# Patient Record
Sex: MALE | Race: WHITE | NOT HISPANIC OR LATINO | Employment: FULL TIME | URBAN - METROPOLITAN AREA
[De-identification: names, ages, dates, MRNs, and addresses within clinical notes are randomized per-mention and may not be internally consistent; named-entity substitution may affect disease eponyms.]

---

## 2017-01-23 ENCOUNTER — OFFICE VISIT (OUTPATIENT)
Dept: PEDIATRICS | Facility: CLINIC | Age: 37
End: 2017-01-23
Payer: COMMERCIAL

## 2017-01-23 VITALS
TEMPERATURE: 99.4 F | WEIGHT: 234 LBS | DIASTOLIC BLOOD PRESSURE: 70 MMHG | HEART RATE: 78 BPM | HEIGHT: 70 IN | BODY MASS INDEX: 33.5 KG/M2 | OXYGEN SATURATION: 98 % | SYSTOLIC BLOOD PRESSURE: 110 MMHG

## 2017-01-23 DIAGNOSIS — J20.9 ACUTE BRONCHITIS, UNSPECIFIED ORGANISM: Primary | ICD-10-CM

## 2017-01-23 PROCEDURE — 99213 OFFICE O/P EST LOW 20 MIN: CPT | Performed by: INTERNAL MEDICINE

## 2017-01-23 RX ORDER — CODEINE PHOSPHATE AND GUAIFENESIN 10; 100 MG/5ML; MG/5ML
1 SOLUTION ORAL EVERY 6 HOURS PRN
Qty: 120 ML | Refills: 0 | Status: SHIPPED | OUTPATIENT
Start: 2017-01-23 | End: 2018-01-11

## 2017-01-23 NOTE — PROGRESS NOTES
SUBJECTIVE:                                                    Alexis Piña is a 36 year old male who presents to clinic today for the following health issues:    Acute Illness   Acute illness concerns: fever,cough  Onset: week     Fever: temps     Chills/Sweats: YES    Headache (location?): YES    Sinus Pressure:no    Conjunctivitis:  no    Ear Pain: no    Rhinorrhea: no    Congestion: no    Sore Throat: YES     Cough: YES, nonproductive    Wheeze: no    Decreased Appetite: YES    Nausea: no    Vomiting: no    Diarrhea:  no    Dysuria/Freq.: no    Fatigue/Achiness: YES    Sick/Strep Exposure: YES- wife      Therapies Tried and outcome:       Patient Active Problem List   Diagnosis     Seborrhea     HYPERLIPIDEMIA LDL GOAL <160     Lumbago     GERD (gastroesophageal reflux disease)     Prediabetes     Obesity     Hyperuricemia     LONNY (generalized anxiety disorder)     No past surgical history on file.    Social History   Substance Use Topics     Smoking status: Former Smoker     Smokeless tobacco: Never Used      Comment: quit 12/03/2005     Alcohol Use: 20.0 oz/week     Family History   Problem Relation Age of Onset     Neurologic Disorder Mother      MS     DIABETES Paternal Grandmother      C.A.D. No family hx of      Hypertension No family hx of      Cancer - colorectal No family hx of      Prostate Cancer No family hx of      CANCER Father      father diagnose with bladder cancer at age 61 yrs old         Current Outpatient Prescriptions   Medication Sig Dispense Refill             citalopram (CELEXA) 10 MG tablet Take 1 tablet (10 mg) by mouth daily 90 tablet 3     allopurinol (ZYLOPRIM) 300 MG tablet Take 1 tablet (300 mg) by mouth daily Due for labwork 90 tablet 1     omeprazole (PRILOSEC) 40 MG capsule Take 1 capsule (40 mg) by mouth daily as needed 90 capsule 2     mometasone (ELOCON) 0.1 % ointment Apply sparingly to affected area. 30 g 0     SUMAtriptan (IMITREX) 50 MG tablet Take 1 tablet (50 mg)  "by mouth at onset of headache for migraine May repeat dose in 2 hours.  Do not exceed 200 mg in 24 hours 18 tablet 1       OBJECTIVE:                                                    /70 mmHg  Pulse 78  Temp(Src) 99.4  F (37.4  C) (Oral)  Ht 5' 10\" (1.778 m)  Wt 234 lb (106.142 kg)  BMI 33.58 kg/m2  SpO2 98% Body mass index is 33.58 kg/(m^2).  GENERAL:  alert,  no distress  HENT: ear canals- normal; TMs- normal; oropharynx-normal  NECK: no tenderness, no adenopathy  RESP: lungs clear to auscultation - no rales, no rhonchi, no wheezes  CV: regular rates and rhythm, normal S1 S2     ASSESSMENT/PLAN:                                                        ICD-10-CM    1. Acute bronchitis, unspecified organism J20.9 guaiFENesin-codeine (ROBITUSSIN AC) 100-10 MG/5ML SOLN solution      Rest, fluids, otc symptomatic cares  Guaifenesin AC prn  Discussed rx if sx fail to improve over next 5-7 days    Tray Monroe MD  [facility]     "

## 2017-01-23 NOTE — PATIENT INSTRUCTIONS
Symptom management for cough and upper respiratory symptoms:    Sore throat: Warm salt water gargle as needed    Cough: Guaifenesin- an expectorant that helps to thin mucus and allow it to be cleared more easily.  Dextromethorphan helps to suppress cough.    Nasal and sinus congestion and drainage: Decongestants such as pseudoephedrine or phenylephrine help to reduce secretions and relieve stuffiness and pressure-related discomfort. Nasal saline spray can also be used to relieve sinus congestion as well.  Oxymetazoline (Afrin) spray can be used up to 3 days to manage nasal stuffiness.    Muscle aches and headache: Both acetaminophen and ibuprofen are effective-ibuprofen is slightly more effective for muscle aches and headache.  Ibuprofen should always be taken with food and plenty of fluids.      Off work today and tomorrow  If cough fails to improve in the next 3-5 days, start azithromycin

## 2017-01-23 NOTE — MR AVS SNAPSHOT
After Visit Summary   1/23/2017    Alexis Piña    MRN: [id number]           Patient Information     Date Of Birth          1980        Visit Information        Provider Department      1/23/2017 11:20 AM Tray Monroe MD [facility]        Today's Diagnoses     Acute bronchitis, unspecified organism    -  1       Care Instructions    Symptom management for cough and upper respiratory symptoms:    Sore throat: Warm salt water gargle as needed    Cough: Guaifenesin- an expectorant that helps to thin mucus and allow it to be cleared more easily.  Dextromethorphan helps to suppress cough.    Nasal and sinus congestion and drainage: Decongestants such as pseudoephedrine or phenylephrine help to reduce secretions and relieve stuffiness and pressure-related discomfort. Nasal saline spray can also be used to relieve sinus congestion as well.  Oxymetazoline (Afrin) spray can be used up to 3 days to manage nasal stuffiness.    Muscle aches and headache: Both acetaminophen and ibuprofen are effective-ibuprofen is slightly more effective for muscle aches and headache.  Ibuprofen should always be taken with food and plenty of fluids.      Off work today and tomorrow  If cough fails to improve in the next 3-5 days, start azithromycin          Follow-ups after your visit        Who to contact     If you have questions or need follow up information about today's clinic visit or your schedule please contact [facility] directly at [phone].  Normal or non-critical lab and imaging results will be communicated to you by MyChart, letter or phone within 4 business days after the clinic has received the results. If you do not hear from us within 7 days, please contact the clinic through MyChart or phone. If you have a critical or abnormal lab result, we will notify you by phone as soon as possible.  Submit refill requests through [employer] or call your pharmacy and they will forward the  "refill request to us. Please allow 3 business days for your refill to be completed.          Additional Information About Your Visit        [employer] Information     [employer] gives you secure access to your electronic health record. If you see a primary care provider, you can also send messages to your care team and make appointments. If you have questions, please call your primary care clinic.  If you do not have a primary care provider, please call [phone] and they will assist you.        Care EveryWhere ID     This is your Care EveryWhere ID. This could be used by other organizations to access your [facility] medical records  [id number]        Your Vitals Were     Pulse Temperature Height BMI (Body Mass Index) Pulse Oximetry       78 99.4  F (37.4  C) (Oral) 5' 10\" (1.778 m) 33.58 kg/m2 98%        Blood Pressure from Last 3 Encounters:   01/23/17 110/70   10/05/16 122/84   04/05/16 118/72    Weight from Last 3 Encounters:   01/23/17 234 lb (106.142 kg)   10/05/16 226 lb 4.8 oz (102.649 kg)   04/05/16 227 lb 3.2 oz (103.057 kg)              Today, you had the following     No orders found for display         Today's Medication Changes          These changes are accurate as of: 1/23/17 12:07 PM.  If you have any questions, ask your nurse or doctor.               Start taking these medicines.        Dose/Directions    guaiFENesin-codeine 100-10 MG/5ML Soln solution   Commonly known as:  ROBITUSSIN AC   Used for:  Acute bronchitis, unspecified organism   Started by:  Tray Monroe MD        Dose:  1 tsp.   Take 5 mLs by mouth every 6 hours as needed for cough   Quantity:  120 mL   Refills:  0         Stop taking these medicines if you haven't already. Please contact your care team if you have questions.     azithromycin 250 MG tablet   Commonly known as:  ZITHROMAX   Stopped by:  Tray Monroe MD           benzonatate 100 MG capsule   Commonly known as:  TESSALON   Stopped by:  Tray Monroe MD "           propranolol 80 MG 24 hr capsule   Commonly known as:  INDERAL LA   Stopped by:  Tray Monroe MD                Where to get your medicines      Some of these will need a paper prescription and others can be bought over the counter.  Ask your nurse if you have questions.     Bring a paper prescription for each of these medications    - guaiFENesin-codeine 100-10 MG/5ML Soln solution             Primary Care Provider Office Phone # Fax #    Tray Monroe [phone]       [facility] [address] [place] [address]        Thank you!     Thank you for choosing [facility]  for your care. Our goal is always to provide you with excellent care. Hearing back from our patients is one way we can continue to improve our services. Please take a few minutes to complete the written survey that you may receive in the mail after your visit with us. Thank you!             Your Updated Medication List - Protect others around you: Learn how to safely use, store and throw away your medicines at www.disposemymeds.org.          This list is accurate as of: 1/23/17 12:07 PM.  Always use your most recent med list.                   Brand Name Dispense Instructions for use    allopurinol 300 MG tablet    ZYLOPRIM    90 tablet    Take 1 tablet (300 mg) by mouth daily Due for labwork       citalopram 10 MG tablet    celeXA    90 tablet    Take 1 tablet (10 mg) by mouth daily       guaiFENesin-codeine 100-10 MG/5ML Soln solution    ROBITUSSIN AC    120 mL    Take 5 mLs by mouth every 6 hours as needed for cough       mometasone 0.1 % ointment    ELOCON    30 g    Apply sparingly to affected area.       omeprazole 40 MG capsule    priLOSEC    90 capsule    Take 1 capsule (40 mg) by mouth daily as needed       SUMAtriptan 50 MG tablet    IMITREX    18 tablet    Take 1 tablet (50 mg) by mouth at onset of headache for migraine May repeat dose in 2 hours.  Do not exceed 200 mg in 24  hours

## 2017-01-24 ENCOUNTER — TELEPHONE (OUTPATIENT)
Dept: PEDIATRICS | Facility: CLINIC | Age: 37
End: 2017-01-24

## 2017-01-24 DIAGNOSIS — J20.9 ACUTE BRONCHITIS: Primary | ICD-10-CM

## 2017-01-24 RX ORDER — AZITHROMYCIN 250 MG/1
TABLET, FILM COATED ORAL
Qty: 6 TABLET | Refills: 0 | Status: SHIPPED | OUTPATIENT
Start: 2017-01-24 | End: 2018-01-11

## 2017-01-24 NOTE — TELEPHONE ENCOUNTER
Pt says he has had worst ST of his life last night.  Has been ill for over 1wk.  Saw DR. BECK yesterday:  Off work today and tomorrow  If cough fails to improve in the next 3-5 days, start azithromycin  DR. BECK is OK with the Z pack today.  Sent to pharmacy now and pt informed.  Fela Mora RN

## 2017-01-26 NOTE — TELEPHONE ENCOUNTER
Call from patient-started z-pack.  States that he continues to cough-developed pleurecy he is not SOB, or wheezing.  Patient getting better- states that he has had low grade, no fever now. Has been using robitussin with codeine at nighttime, which helps a lot.  Patient is improving.    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    Advised to continue the abx as prescribe and finish the course.  Asked patient to call back with new symptoms or if he is not improving.  He verbalized understanding.    Marielena Handley, RN  Message handled by Nurse Triage.

## 2017-02-16 ENCOUNTER — MYC MEDICAL ADVICE (OUTPATIENT)
Dept: PEDIATRICS | Facility: CLINIC | Age: 37
End: 2017-02-16

## 2017-02-16 DIAGNOSIS — R05.9 COUGH: Primary | ICD-10-CM

## 2017-02-16 RX ORDER — ALBUTEROL SULFATE 90 UG/1
2 AEROSOL, METERED RESPIRATORY (INHALATION) EVERY 6 HOURS PRN
Qty: 1 INHALER | Refills: 0 | Status: SHIPPED | OUTPATIENT
Start: 2017-02-16 | End: 2018-01-11

## 2017-03-15 DIAGNOSIS — E79.0 HYPERURICEMIA: ICD-10-CM

## 2017-03-15 NOTE — TELEPHONE ENCOUNTER
ALLOPURINOL 300MG       Last Written Prescription Date: 9/19/2016  Last Fill Quantity: 90, # refills: 1  Last Office Visit with [facility], [facility] or [facility] prescribing provider:  1/23/2017        Uric Acid   Date Value Ref Range Status   03/03/2015 8.0 (H) 3.5 - 7.2 mg/dL Final     Comment:     Effective 7/30/2014, the reference range for this assay has changed to reflect   new instrumentation/methodology.     ]  Creatinine   Date Value Ref Range Status   12/14/2011 0.94 0.66 - 1.25 mg/dL Final   ]  Lab Results   Component Value Date    WBC 6.6 12/19/2011     Lab Results   Component Value Date    RBC 5.13 12/19/2011     Lab Results   Component Value Date    HGB 16.1 12/19/2011     Lab Results   Component Value Date    HCT 45.1 12/19/2011     No components found for: MCT  Lab Results   Component Value Date    MCV 88 12/19/2011     Lab Results   Component Value Date    MCH 31.4 12/19/2011     Lab Results   Component Value Date    MCHC 35.7 12/19/2011     Lab Results   Component Value Date    RDW 12.6 12/19/2011     Lab Results   Component Value Date     12/19/2011     Lab Results   Component Value Date    AST 30 12/14/2011     Lab Results   Component Value Date    ALT 48 12/14/2011

## 2017-03-20 RX ORDER — ALLOPURINOL 300 MG/1
300 TABLET ORAL DAILY
Qty: 90 TABLET | Refills: 1 | Status: SHIPPED | OUTPATIENT
Start: 2017-03-20 | End: 2019-08-12

## 2017-03-20 NOTE — TELEPHONE ENCOUNTER
Routing refill request to provider for review/approval because:  Labs not current:  Uric acid, CBC, CMP. (labs are ordered from 9/16)  Please sign if ok.   Chel Duque, RN  Triage Nurse

## 2017-03-31 DIAGNOSIS — K21.9 GERD (GASTROESOPHAGEAL REFLUX DISEASE): Primary | ICD-10-CM

## 2017-03-31 RX ORDER — OMEPRAZOLE 40 MG/1
40 CAPSULE, DELAYED RELEASE ORAL DAILY PRN
Qty: 90 CAPSULE | Refills: 1 | Status: SHIPPED | OUTPATIENT
Start: 2017-03-31 | End: 2017-10-13

## 2017-03-31 NOTE — TELEPHONE ENCOUNTER
omeprazole (PRILOSEC) 40 MG capsule      Last Written Prescription Date: 05-  Last Fill Quantity: 90,  # refills: 2   Last Office Visit with [facility], [facility] or [facility] prescribing provider: 01-

## 2017-03-31 NOTE — TELEPHONE ENCOUNTER
Prescription approved per [facility] [place].  Chel Duque, RN  Triage Nurse

## 2017-10-09 ENCOUNTER — TELEPHONE (OUTPATIENT)
Dept: PEDIATRICS | Facility: CLINIC | Age: 37
End: 2017-10-09

## 2017-10-09 DIAGNOSIS — R00.1 BRADYCARDIA: Primary | ICD-10-CM

## 2017-10-09 NOTE — TELEPHONE ENCOUNTER
Requesting referral to see a cardiologist    Patient recently finished a 50 mile bike race yesterday.     Felt his HR slow down but could feel bound sensation; so he slowed down his pace at the time. Did not manually check his pulse. Denied feeling light headedness, dizziness, or experiencing any vision changes at that time. Denied chest pain or numbness/tingling to extremities.This is the second time he has experienced this as he had the same episode occur at his last cycling event about 2 weeks ago    He reports he was wearing a 'heart monitor' device but does not believe it actually accurate. His HR dropped from 155 bpm to 60's in a manner of seconds .      PCP please advise of request. He reports no other sx at this time.      Juanis LÓPEZ RN, BSN, PHN  [place] [facility] RN

## 2017-10-13 ENCOUNTER — MYC REFILL (OUTPATIENT)
Dept: PEDIATRICS | Facility: CLINIC | Age: 37
End: 2017-10-13

## 2017-10-13 DIAGNOSIS — K21.9 GERD (GASTROESOPHAGEAL REFLUX DISEASE): ICD-10-CM

## 2017-10-14 NOTE — TELEPHONE ENCOUNTER
omeprazole (PRILOSEC) 40 MG capsule      Last Written Prescription Date: 03/31/2017  Last Fill Quantity: 90,  # refills: 1   Last Office Visit with [facility], [facility] or [facility] prescribing provider: 01/23/2017

## 2017-10-16 RX ORDER — OMEPRAZOLE 40 MG/1
40 CAPSULE, DELAYED RELEASE ORAL DAILY PRN
Qty: 90 CAPSULE | Refills: 0 | Status: SHIPPED | OUTPATIENT
Start: 2017-10-16 | End: 2018-01-11

## 2017-12-04 ENCOUNTER — MYC REFILL (OUTPATIENT)
Dept: PEDIATRICS | Facility: CLINIC | Age: 37
End: 2017-12-04

## 2017-12-04 DIAGNOSIS — R51.9 INTRACTABLE HEADACHE, UNSPECIFIED CHRONICITY PATTERN, UNSPECIFIED HEADACHE TYPE: ICD-10-CM

## 2017-12-04 NOTE — TELEPHONE ENCOUNTER
Message from [employer]:  Original authorizing provider: CAMILA Dugan CNP    Alexis Piña would like a refill of the following medications:  SUMAtriptan (IMITREX) 50 MG tablet [CAMILA Dugan CNP]    Preferred pharmacy: [id number] - [place], [place] - [address] AT [facility] [address]    Comment:  Migraines have returned. Please refill.

## 2017-12-04 NOTE — TELEPHONE ENCOUNTER
Imitrex 50 mg      Last Written Prescription Date: 04/05/16  Last Fill Quantity: 18, # refills: 1  Last Office Visit with [facility], [facility] or [facility] prescribing provider: 01/23/17       BP Readings from Last 3 Encounters:   01/23/17 110/70   10/05/16 122/84   04/05/16 118/72     Routing refill request to provider for review/approval because:  A break in medication/office appointment to discuss headaches?  TEDDY Briggs RN

## 2017-12-05 RX ORDER — SUMATRIPTAN 50 MG/1
50 TABLET, FILM COATED ORAL
Qty: 18 TABLET | Refills: 3 | Status: SHIPPED | OUTPATIENT
Start: 2017-12-05 | End: 2019-03-19

## 2018-01-04 ENCOUNTER — MYC REFILL (OUTPATIENT)
Dept: PEDIATRICS | Facility: CLINIC | Age: 38
End: 2018-01-04

## 2018-01-04 DIAGNOSIS — F41.1 GAD (GENERALIZED ANXIETY DISORDER): ICD-10-CM

## 2018-01-05 RX ORDER — CITALOPRAM HYDROBROMIDE 10 MG/1
10 TABLET ORAL DAILY
Qty: 30 TABLET | Refills: 0 | Status: SHIPPED | OUTPATIENT
Start: 2018-01-05 | End: 2018-01-11

## 2018-01-05 NOTE — TELEPHONE ENCOUNTER
Medication is being filled for 1 time refill only due to:  Patient needs to be seen because due for an annual physical and medication recheck.     Prescription approved per [facility] Refill Protocol.    Juanis LÓPEZ RN, BSN, PHN  [facility]

## 2018-01-05 NOTE — TELEPHONE ENCOUNTER
Please assist patient with scheduling an annual physical    Juanis LÓPEZ RN, BSN, PHN  [place] [facility] RN

## 2018-01-05 NOTE — TELEPHONE ENCOUNTER
Message from MyChart:  Original authorizing provider: Tray Monroe MD, MD    Alexis Piña would like a refill of the following medications:  citalopram (CELEXA) 10 MG tablet [Tray Monroe MD, MD]    Preferred pharmacy: [id number] - [place], [place] - [address] AT [facility] [address]    Comment:

## 2018-01-11 ENCOUNTER — OFFICE VISIT (OUTPATIENT)
Dept: PEDIATRICS | Facility: CLINIC | Age: 38
End: 2018-01-11
Payer: COMMERCIAL

## 2018-01-11 VITALS
BODY MASS INDEX: 33.5 KG/M2 | HEIGHT: 70 IN | WEIGHT: 234 LBS | DIASTOLIC BLOOD PRESSURE: 70 MMHG | SYSTOLIC BLOOD PRESSURE: 108 MMHG | HEART RATE: 60 BPM

## 2018-01-11 DIAGNOSIS — G47.30 SLEEP APNEA, UNSPECIFIED TYPE: ICD-10-CM

## 2018-01-11 DIAGNOSIS — E78.5 HYPERLIPIDEMIA LDL GOAL <160: ICD-10-CM

## 2018-01-11 DIAGNOSIS — Z00.00 ENCOUNTER FOR ROUTINE ADULT HEALTH EXAMINATION WITHOUT ABNORMAL FINDINGS: Primary | ICD-10-CM

## 2018-01-11 DIAGNOSIS — K21.9 GASTROESOPHAGEAL REFLUX DISEASE WITHOUT ESOPHAGITIS: ICD-10-CM

## 2018-01-11 DIAGNOSIS — R73.03 PREDIABETES: ICD-10-CM

## 2018-01-11 DIAGNOSIS — F41.1 GAD (GENERALIZED ANXIETY DISORDER): ICD-10-CM

## 2018-01-11 DIAGNOSIS — E79.0 HYPERURICEMIA: ICD-10-CM

## 2018-01-11 LAB — HBA1C MFR BLD: 5.2 % (ref 4.3–6)

## 2018-01-11 PROCEDURE — 36415 COLL VENOUS BLD VENIPUNCTURE: CPT | Performed by: INTERNAL MEDICINE

## 2018-01-11 PROCEDURE — 99213 OFFICE O/P EST LOW 20 MIN: CPT | Mod: 25 | Performed by: INTERNAL MEDICINE

## 2018-01-11 PROCEDURE — 84550 ASSAY OF BLOOD/URIC ACID: CPT | Performed by: INTERNAL MEDICINE

## 2018-01-11 PROCEDURE — 83721 ASSAY OF BLOOD LIPOPROTEIN: CPT | Mod: 59 | Performed by: INTERNAL MEDICINE

## 2018-01-11 PROCEDURE — 80053 COMPREHEN METABOLIC PANEL: CPT | Performed by: INTERNAL MEDICINE

## 2018-01-11 PROCEDURE — 99395 PREV VISIT EST AGE 18-39: CPT | Performed by: INTERNAL MEDICINE

## 2018-01-11 PROCEDURE — 83036 HEMOGLOBIN GLYCOSYLATED A1C: CPT | Performed by: INTERNAL MEDICINE

## 2018-01-11 PROCEDURE — 80061 LIPID PANEL: CPT | Performed by: INTERNAL MEDICINE

## 2018-01-11 RX ORDER — CITALOPRAM HYDROBROMIDE 10 MG/1
10 TABLET ORAL DAILY
Qty: 90 TABLET | Refills: 3 | Status: SHIPPED | OUTPATIENT
Start: 2018-01-11 | End: 2019-03-19

## 2018-01-11 RX ORDER — OMEPRAZOLE 40 MG/1
40 CAPSULE, DELAYED RELEASE ORAL DAILY PRN
Qty: 90 CAPSULE | Refills: 0 | Status: CANCELLED | OUTPATIENT
Start: 2018-01-11

## 2018-01-11 NOTE — NURSING NOTE
"Chief Complaint   Patient presents with     Physical       Initial /70 (Cuff Size: Adult Large)  Pulse 60  Ht 5' 10\" (1.778 m)  Wt 234 lb (106.1 kg)  BMI 33.58 kg/m2 Estimated body mass index is 33.58 kg/(m^2) as calculated from the following:    Height as of this encounter: 5' 10\" (1.778 m).    Weight as of this encounter: 234 lb (106.1 kg).  Medication Reconciliation: missy Orellana CMA    "

## 2018-01-11 NOTE — PATIENT INSTRUCTIONS
INSTRUCTIONS FOR TODAY:     reduce omeprazole to 20mg and take as needed.  If symptoms recur can try alternating days with ranitidine 150mg twice per day   clarify regarding father's history, we can set up colonoscopy if needed   schedule sleep study    Dr Monroe    Preventive Health Recommendations  Male Ages 26 - 39    Yearly exam:             See your health care provider every year in order to  o   Review health changes.   o   Discuss preventive care.    o   Review your medicines if your doctor has prescribed any.    You should be tested each year for STDs (sexually transmitted diseases), if you re at risk.     After age 35, talk to your provider about cholesterol testing. If you are at risk for heart disease, have your cholesterol tested at least every 5 years.     If you are at risk for diabetes, you should have a diabetes test (fasting glucose).  Shots: Get a flu shot each year. Get a tetanus shot every 10 years.     Nutrition:    Eat at least 5 servings of fruits and vegetables daily.     Eat whole-grain bread, whole-wheat pasta and brown rice instead of white grains and rice.     Talk to your provider about Calcium and Vitamin D.     Lifestyle    Exercise for at least 150 minutes a week (30 minutes a day, 5 days a week). This will help you control your weight and prevent disease.     Limit alcohol to one drink per day.     No smoking.     Wear sunscreen to prevent skin cancer.     See your dentist every six months for an exam and cleaning.

## 2018-01-11 NOTE — MR AVS SNAPSHOT
After Visit Summary   1/11/2018    Alexis Piña    MRN: [id number]           Patient Information     Date Of Birth          1980        Visit Information        Provider Department      1/11/2018 9:40 AM Tray Monroe MD [facility]        Today's Diagnoses     Encounter for routine adult health examination without abnormal findings    -  1    LONNY (generalized anxiety disorder)        Prediabetes        Hyperlipidemia LDL goal <160        Gastroesophageal reflux disease without esophagitis        Hyperuricemia        Sleep apnea, unspecified type          Care Instructions    INSTRUCTIONS FOR TODAY:     reduce omeprazole to 20mg and take as needed.  If symptoms recur can try alternating days with ranitidine 150mg twice per day   clarify regarding father's history, we can set up colonoscopy if needed   schedule sleep study    Dr Monroe    Preventive Health Recommendations  Male Ages 26 - 39    Yearly exam:             See your health care provider every year in order to  o   Review health changes.   o   Discuss preventive care.    o   Review your medicines if your doctor has prescribed any.    You should be tested each year for STDs (sexually transmitted diseases), if you re at risk.     After age 35, talk to your provider about cholesterol testing. If you are at risk for heart disease, have your cholesterol tested at least every 5 years.     If you are at risk for diabetes, you should have a diabetes test (fasting glucose).  Shots: Get a flu shot each year. Get a tetanus shot every 10 years.     Nutrition:    Eat at least 5 servings of fruits and vegetables daily.     Eat whole-grain bread, whole-wheat pasta and brown rice instead of white grains and rice.     Talk to your provider about Calcium and Vitamin D.     Lifestyle    Exercise for at least 150 minutes a week (30 minutes a day, 5 days a week). This will help you control your weight and prevent disease.     Limit alcohol to one  drink per day.     No smoking.     Wear sunscreen to prevent skin cancer.     See your dentist every six months for an exam and cleaning.             Follow-ups after your visit        Additional Services     SLEEP EVALUATION & MANAGEMENT REFERRAL - [facility] [phone]  (Age 18 and up)       Please be aware that coverage of these services is subject to the terms and limitations of your health insurance plan.  Call member services at your health plan with any benefit or coverage questions.      Please bring the following to your appointment:    >>   List of current medications   >>   This referral request   >>   Any documents/labs given to you for this referral                      Future tests that were ordered for you today     Open Future Orders        Priority Expected Expires Ordered    SLEEP EVALUATION & MANAGEMENT REFERRAL - [facility] [phone]  (Age 18 and up) Routine  1/11/2019 1/11/2018            Who to contact     If you have questions or need follow up information about today's clinic visit or your schedule please contact [facility] directly at [phone].  Normal or non-critical lab and imaging results will be communicated to you by MyChart, letter or phone within 4 business days after the clinic has received the results. If you do not hear from us within 7 days, please contact the clinic through MyChart or phone. If you have a critical or abnormal lab result, we will notify you by phone as soon as possible.  Submit refill requests through [employer] or call your pharmacy and they will forward the refill request to us. Please allow 3 business days for your refill to be completed.          Additional Information About Your Visit        [employer] Information     [employer] gives you secure access to your electronic health record. If you see a primary care provider, you can also send messages to your care team and make  "appointments. If you have questions, please call your primary care clinic.  If you do not have a primary care provider, please call [phone] and they will assist you.        Care EveryWhere ID     This is your Care EveryWhere ID. This could be used by other organizations to access your [facility] medical records  [id number]        Your Vitals Were     Pulse Height BMI (Body Mass Index)             60 5' 10\" (1.778 m) 33.58 kg/m2          Blood Pressure from Last 3 Encounters:   01/11/18 108/70   01/23/17 110/70   10/05/16 122/84    Weight from Last 3 Encounters:   01/11/18 234 lb (106.1 kg)   01/23/17 234 lb (106.1 kg)   10/05/16 226 lb 4.8 oz (102.6 kg)              We Performed the Following     Comprehensive metabolic panel     Hemoglobin A1c     Lipid panel reflex to direct LDL Fasting     Uric acid          Today's Medication Changes          These changes are accurate as of: 1/11/18 10:24 AM.  If you have any questions, ask your nurse or doctor.               These medicines have changed or have updated prescriptions.        Dose/Directions    omeprazole 20 MG CR capsule   Commonly known as:  priLOSEC   This may have changed:    - medication strength  - how much to take  - when to take this  - reasons to take this   Used for:  Gastroesophageal reflux disease without esophagitis   Changed by:  Tray Monroe MD        Dose:  20 mg   Take 1 capsule (20 mg) by mouth daily   Quantity:  90 capsule   Refills:  3         Stop taking these medicines if you haven't already. Please contact your care team if you have questions.     albuterol 108 (90 BASE) MCG/ACT Inhaler   Commonly known as:  PROAIR HFA/PROVENTIL HFA/VENTOLIN HFA   Stopped by:  Tray Monroe MD           azithromycin 250 MG tablet   Commonly known as:  ZITHROMAX   Stopped by:  Tray Monroe MD           guaiFENesin-codeine 100-10 MG/5ML Soln solution   Commonly known as:  ROBITUSSIN AC   Stopped by:  Tray Monroe MD        "         Where to get your medicines      These medications were sent to [id number] - [place], [place] - [address] AT [facility] [address] [address]  [address], [place] [address]    Hours:  24-hours Phone:  [phone]     citalopram 10 MG tablet    omeprazole 20 MG CR capsule                Primary Care Provider Office Phone # Fax #    Tray Monroe [phone] [address] [place] [address]        Equal Access to Services     [facility]: Hadii aad ku hadasho Soomaali, waaxda luqadaha, qaybta kaalmada adeegyada, waxay idiin hayaan adeeg juarez rios . So [facility] [phone].    ATENCIÓN: Si habla español, tiene a arreola disposición servicios gratuitos de asistencia lingüística. [facility] [phone].    We comply with applicable federal civil rights laws and [place] laws. We do not discriminate on the basis of race, color, national origin, age, disability, sex, sexual orientation, or gender identity.            Thank you!     Thank you for choosing [facility]  for your care. Our goal is always to provide you with excellent care. Hearing back from our patients is one way we can continue to improve our services. Please take a few minutes to complete the written survey that you may receive in the mail after your visit with us. Thank you!             Your Updated Medication List - Protect others around you: Learn how to safely use, store and throw away your medicines at www.disposemymeds.org.          This list is accurate as of: 1/11/18 10:24 AM.  Always use your most recent med list.                   Brand Name Dispense Instructions for use Diagnosis    allopurinol 300 MG tablet    ZYLOPRIM    90 tablet    Take 1 tablet (300 mg) by mouth daily Due for labwork    Hyperuricemia       citalopram 10 MG tablet    celeXA    90 tablet    Take 1 tablet (10 mg) by mouth daily    LONNY (generalized anxiety disorder)       mometasone 0.1 %  ointment    ELOCON    30 g    Apply sparingly to affected area.    Psoriasis       omeprazole 20 MG CR capsule    priLOSEC    90 capsule    Take 1 capsule (20 mg) by mouth daily    Gastroesophageal reflux disease without esophagitis       SUMAtriptan 50 MG tablet    IMITREX    18 tablet    Take 1 tablet (50 mg) by mouth at onset of headache for migraine May repeat dose in 2 hours.  Do not exceed 200 mg in 24 hours    Intractable headache, unspecified chronicity pattern, unspecified headache type

## 2018-01-11 NOTE — PROGRESS NOTES
SUBJECTIVE:   CC: Alexis Piña is an 37 year old male who presents for preventative health visit.     Physical   Annual:     Getting at least 3 servings of Calcium per day::  Yes    Bi-annual eye exam::  Yes    Dental care twice a year::  NO    Sleep apnea or symptoms of sleep apnea::  Excessive snoring    Diet::  Regular (no restrictions)    Frequency of exercise::  4-5 days/week    Duration of exercise::  30-45 minutes    Taking medications regularly::  Yes    Medication side effects::  Not applicable    Additional concerns today::  YES (1) c/o loud snoring.  wife notes periods of apnea.   daytime sleepiness at times)              Questions about sleep apeana    Today's PHQ-2 Score:   PHQ-2 ( 1999 [employer]) 1/11/2018   Q1: Little interest or pleasure in doing things 0   Q2: Feeling down, depressed or hopeless 0   PHQ-2 Score 0   Q1: Little interest or pleasure in doing things Not at all   Q2: Feeling down, depressed or hopeless Not at all   PHQ-2 Score 0       Abuse: Current or Past(Physical, Sexual or Emotional)- No  Do you feel safe in your environment - Yes    Social History   Substance Use Topics     Smoking status: Former Smoker     Smokeless tobacco: Never Used      Comment: quit 12/03/2005     Alcohol use 20.0 oz/week     Alcohol Use 1/11/2018   If you drink alcohol, do you typically have greater than 3 drinks per day OR greater than 7 drinks per week?   No   No flowsheet data found.    Reviewed orders with patient. Reviewed health maintenance and updated orders accordingly - Yes    Reviewed and updated as needed this visit by clinical staff  Tobacco  Allergies  Meds       Reviewed and updated as needed this visit by Provider        Patient Active Problem List   Diagnosis     Seborrhea     HYPERLIPIDEMIA LDL GOAL <160     Lumbago     Prediabetes     Obesity     Hyperuricemia     LONNY (generalized anxiety disorder)     Past Medical History:   Diagnosis Date     Pure hypercholesterolemia      Seborrhea       "Tobacco use disorder      Vitamin D deficiency 12/22/2011       No past surgical history on file.    Family History   Problem Relation Age of Onset     Neurologic Disorder Mother      MS     DIABETES Paternal Grandmother      C.A.D. No family hx of      Hypertension No family hx of      Cancer - colorectal No family hx of      Prostate Cancer No family hx of      CANCER Father      father diagnose with bladder cancer at age 61 yrs old       ALLERGIES     Allergies   Allergen Reactions     Amoxicillin [Penicillins] Hives     Doxy [Doxycycline Hyclate] Hives         Review of Systems  C: NEGATIVE for fever, chills, change in weight  I: NEGATIVE for worrisome rashes, moles or lesions  E: NEGATIVE for vision changes or irritation  ENT: NEGATIVE for ear, mouth and throat problems  R: NEGATIVE for significant cough or SOB  CV: NEGATIVE for chest pain, palpitations or peripheral edema  GI: NEGATIVE for nausea, abdominal pain, heartburn, or change in bowel habits   male: negative for dysuria, hematuria, decreased urinary stream, erectile dysfunction, urethral discharge  M: NEGATIVE for significant arthralgias or myalgia  N: NEGATIVE for weakness, dizziness or paresthesias  P: NEGATIVE for changes in mood or affect    OBJECTIVE:   /70 (Cuff Size: Adult Large)  Pulse 60  Ht 5' 10\" (1.778 m)  Wt 234 lb (106.1 kg)  BMI 33.58 kg/m2    Physical Exam  GENERAL: healthy, alert and no distress  EYES: Eyes grossly normal to inspection, PERRL and conjunctivae and sclerae normal  HENT: ear canals and TM's normal, nose and mouth without ulcers or lesions  NECK: no adenopathy, no asymmetry, masses, or scars and thyroid normal to palpation  RESP: lungs clear to auscultation - no rales, rhonchi or wheezes  CV: regular rate and rhythm, normal S1 S2, no S3 or S4, no murmur, click or rub, no peripheral edema and peripheral pulses strong  ABDOMEN: soft, nontender, no hepatosplenomegaly, no masses and bowel sounds normal  MS: no " "gross musculoskeletal defects noted, no edema  SKIN: no suspicious lesions or rashes  NEURO: Normal strength and tone, mentation intact and speech normal  PSYCH: mentation appears normal, affect normal/bright    ASSESSMENT/PLAN:       ICD-10-CM    1. Encounter for routine adult health examination without abnormal findings Z00.00        2. Sleep apnea, unspecified type G47.30 SLEEP EVALUATION & MANAGEMENT REFERRAL - [facility] [phone]  (Age 18 and up)      History characteristic for symptomatic RK.  Recommended sleep study, discussed CPAP.  Referred to sleep center.     3. LONNY (generalized anxiety disorder) F41.1 citalopram (CELEXA) 10 MG tablet     Symptoms well controlled on current regimen.     4. Prediabetes R73.03 Hemoglobin A1c      Discussed weight management, weight loss goals.  Check A1c.     5. Hyperlipidemia LDL goal <160 E78.5 Lipid panel reflex to direct LDL Fasting     Comprehensive metabolic panel     6. Gastroesophageal reflux disease without esophagitis K21.9 omeprazole (PRILOSEC) 20 MG CR capsule      Acid reflux.  Recommended reducing omeprazole from 40 mg to 20 mg once daily.  Also recommended intermittent PPI use rather than daily, may alternate with ranitidine.       7. Hyperuricemia E79.0 Uric acid  History of hyperuricemia, patient stopped allopurinol.  Repeat uric acid today       COUNSELING:   Reviewed preventive health counseling, as reflected in patient instructions           reports that he has quit smoking. He has never used smokeless tobacco.      Estimated body mass index is 33.58 kg/(m^2) as calculated from the following:    Height as of this encounter: 5' 10\" (1.778 m).    Weight as of this encounter: 234 lb (106.1 kg).   Weight management plan: Discussed healthy diet and exercise guidelines and patient will follow up in 12 months in clinic to re-evaluate.    Counseling Resources:  ATP IV Guidelines  Pooled Cohorts Equation Calculator  FRAX Risk " Assessment  ICSI Preventive Guidelines  Dietary Guidelines for Americans, 2010  USDA's MyPlate  ASA Prophylaxis  Lung CA Screening    Tray Monroe MD, MD  [facility]

## 2018-01-12 LAB
ALBUMIN SERPL-MCNC: 4.4 G/DL (ref 3.4–5)
ALP SERPL-CCNC: 67 U/L (ref 40–150)
ALT SERPL W P-5'-P-CCNC: 46 U/L (ref 0–70)
ANION GAP SERPL CALCULATED.3IONS-SCNC: 9 MMOL/L (ref 3–14)
AST SERPL W P-5'-P-CCNC: 29 U/L (ref 0–45)
BILIRUB SERPL-MCNC: 0.8 MG/DL (ref 0.2–1.3)
BUN SERPL-MCNC: 14 MG/DL (ref 7–30)
CALCIUM SERPL-MCNC: 8.9 MG/DL (ref 8.5–10.1)
CHLORIDE SERPL-SCNC: 107 MMOL/L (ref 94–109)
CHOLEST SERPL-MCNC: 211 MG/DL
CO2 SERPL-SCNC: 25 MMOL/L (ref 20–32)
CREAT SERPL-MCNC: 0.92 MG/DL (ref 0.66–1.25)
GFR SERPL CREATININE-BSD FRML MDRD: >90 ML/MIN/1.7M2
GLUCOSE SERPL-MCNC: 105 MG/DL (ref 70–99)
HDLC SERPL-MCNC: 35 MG/DL
LDLC SERPL CALC-MCNC: ABNORMAL MG/DL
LDLC SERPL DIRECT ASSAY-MCNC: 108 MG/DL
NONHDLC SERPL-MCNC: 176 MG/DL
POTASSIUM SERPL-SCNC: 4.2 MMOL/L (ref 3.4–5.3)
PROT SERPL-MCNC: 7.5 G/DL (ref 6.8–8.8)
SODIUM SERPL-SCNC: 141 MMOL/L (ref 133–144)
TRIGL SERPL-MCNC: 462 MG/DL
URATE SERPL-MCNC: 7.6 MG/DL (ref 3.5–7.2)

## 2018-04-04 ENCOUNTER — TELEPHONE (OUTPATIENT)
Dept: CARDIOLOGY | Facility: CLINIC | Age: 38
End: 2018-04-04

## 2018-04-04 NOTE — TELEPHONE ENCOUNTER
Left message for patient to call back as we prep chart for cardiology consult.  Questioning symptoms, if he has had any testing done, and if he has made or seen anyone for sleep apnea and if so where.  Will await a call back.

## 2018-04-12 ENCOUNTER — OFFICE VISIT (OUTPATIENT)
Dept: SLEEP MEDICINE | Facility: CLINIC | Age: 38
End: 2018-04-12
Attending: INTERNAL MEDICINE
Payer: COMMERCIAL

## 2018-04-12 VITALS
BODY MASS INDEX: 34.5 KG/M2 | OXYGEN SATURATION: 97 % | SYSTOLIC BLOOD PRESSURE: 134 MMHG | RESPIRATION RATE: 16 BRPM | WEIGHT: 241 LBS | DIASTOLIC BLOOD PRESSURE: 85 MMHG | HEIGHT: 70 IN | HEART RATE: 85 BPM

## 2018-04-12 DIAGNOSIS — R06.83 SNORING: ICD-10-CM

## 2018-04-12 DIAGNOSIS — G47.21 DELAYED SLEEP PHASE SYNDROME: ICD-10-CM

## 2018-04-12 DIAGNOSIS — G47.30 OBSERVED SLEEP APNEA: Primary | ICD-10-CM

## 2018-04-12 PROCEDURE — 99244 OFF/OP CNSLTJ NEW/EST MOD 40: CPT | Performed by: PHYSICIAN ASSISTANT

## 2018-04-12 NOTE — NURSING NOTE
"Chief Complaint   Patient presents with     Sleep Problem     Snoring, witnessed apnea       Initial /85  Pulse 85  Resp 16  Ht 1.778 m (5' 10\")  Wt 109.3 kg (241 lb)  SpO2 97%  BMI 34.58 kg/m2 Estimated body mass index is 34.58 kg/(m^2) as calculated from the following:    Height as of this encounter: 1.778 m (5' 10\").    Weight as of this encounter: 109.3 kg (241 lb).  Medication Reconciliation: complete     ESS 5  Neck 48cm  Violeta Wong MA      "

## 2018-04-12 NOTE — PATIENT INSTRUCTIONS
"MY TREATMENT INFORMATION FOR SLEEP APNEA-  Alexis Zunigamalina    DOCTOR : Bennett Ezra Goltz, PA-C  SLEEP CENTER : [place]     MY CONTACT NUMBER: [phone]    Am I having a sleep study at a sleep center?  Make sure you have an appointment for the study before you leave!    Am I having a home sleep study?  Watch this video:  https://www.RingTu.com/watch?v=CteI_[email or web address]&[email or web address]    Frequently asked questions:  1. What is Obstructive Sleep Apnea (RK)? RK is the most common type of sleep apnea. Apnea means, \"without breath.\"  Apnea is most often caused by narrowing or collapse of the upper airway as muscles relax during sleep.   Almost everyone has occasional apneas. Most people with sleep apnea have had brief interruptions at night frequently for many years.  The severity of sleep apnea is related to how frequent and severe the events are.   2. What are the consequences of RK? Symptoms include: feeling sleepy during the day, snoring loudly, gasping or stopping of breathing, trouble sleeping, and occasionally morning headaches or heartburn at night.  Sleepiness can be serious and even increase the risk of falling asleep while driving. Other health consequences may include development of high blood pressure and other cardiovascular disease in persons who are susceptible. Untreated RK  can contribute to heart disease, stroke and diabetes.   3. What are the treatment options? In most situations, sleep apnea is a lifelong disease that must be managed with daily therapy. Medications are not effective for sleep apnea and surgery is generally not considered until other therapies have been tried. Your treatment is your choice . Continuous Positive Airway (CPAP) works right away and is the therapy that is effective in nearly everyone. An oral device to hold your jaw forward is usually the next most reliable option. Other options include postioning devices (to keep you off your back), weight loss, and " surgery including a tongue pacing device. There is more detail about some of these options below.    Important tips for using CPAP and similar devices   Know your equipment:  CPAP is continuous positive airway pressure that prevents obstructive sleep apnea by keeping the throat from collapsing while you are sleeping. In most cases, the device is  smart  and can slowly self-adjusts if your throat collapses and keeps a record every day of how well you are treated-this information is available to you and your care team.  BPAP is bilevel positive airway pressure that keeps your throat open and also assists each breath with a pressure boost to maintain adequate breathing.  Special kinds of BPAP are used in patients who have inadequate breathing from lung or heart disease. In most cases, the device is  smart  and can slowly self-adjusts to assist breathing. Like CPAP, the device keeps a record of how well you are treated.  Your mask is your connection to the device. You get to choose what feels most comfortable and the staff will help to make sure if fits. Here: are some examples of the different masks that are available:       Key points to remember on your journey with sleep apnea:  1. Sleep study.  PAP devices often need to be adjusted during a sleep study to show that they are effective and adjusted right.  2. Good tips to remember: Try wearing just the mask during a quiet time during the day so your body adapts to wearing it. A humidifier is recommended for comfort in most cases to prevent drying of your nose and throat. Allergy medication from your provider may help you if you are having nasal congestion.  3. Getting settled-in. It takes more than one night for most of us to get used to wearing a mask. Try wearing just the mask during a quiet time during the day so your body adapts to wearing it. A humidifier is recommended for comfort in most cases. Our team will work with you carefully on the first day and will be  in contact within 4 days and again at 2 and 4 weeks for advice and remote device adjustments. Your therapy is evaluated by the device each day.   4. Use it every night. The more you are able to sleep naturally for 7-8 hours, the more likely you will have good sleep and to prevent health risks or symptoms from sleep apnea. Even if you use it 4 hours it helps. Occasionally all of us are unable to use a medical therapy, in sleep apnea, it is not dangerous to miss one night.   5. Communicate. Call our skilled team on the number provided on the first day if your visit for problems that make it difficult to wear the device. Over 2 out of 3 patients can learn to wear the device long-term with help from our team. Remember to call our team or your sleep providers if you are unable to wear the device as we may have other solutions for those who cannot adapt to mask CPAP therapy. It is recommended that you sleep your sleep provider within the first 3 months and yearly after that if you are not having problems.   Take care of your equipment. Make sure you clean your mask and tubing using directions every day and that your filter and mask are replaced as recommended or if they are not working.     BESIDES CPAP, WHAT OTHER THERAPIES ARE THERE?  Positioning Device  Positioning devices are generally used when sleep apnea is mild and only occurs on your back.This example shows a pillow that straps around the waist. It may be appropriate for those whose sleep study shows milder sleep apnea that occurs primarily when lying flat on one's back. Preliminary studies have shown benefit but effectiveness at home may need to be verified by a home sleep test. These devices are generally not covered by medical insurance.  Examples of devices that maintain sleeping on the back to prevent snoring and mild sleep apnea.    Belt type body positioner  Http://Adeze/    Electronic  reminder  Http://nightshifttherapy.com/  Http://www.Bucky Box.com.au/    Oral Appliance  What is oral appliance therapy?  An oral appliance device fits on your teeth at night like a retainer used after having braces. The device is made by a specialized dentist and requires several visits over 1-2 months before a manufactured device is made to fit your teeth and is adjusted to prevent your sleep apnea. Once an oral device is working properly, snoring should be improved. A home sleep test may be recommended at that time if to determine whether the sleep apnea is adequately treated.       Some things to remember:  -Oral devices are often, but not always, covered by your medical insurance. Be sure to check with your insurance provider.   -If you are referred for oral therapy, you will be given a list of specialized dentists to consider or you may choose to visit the Web site of the American Academy of Dental Sleep Medicine  -Oral devices are less likely to work if you have severe sleep apnea or are extremely overweight.     More detailed information  An oral appliance is a small acrylic device that fits over the upper and lower teeth  (similar to a retainer or a mouth guard). This device slightly moves jaw forward, which moves the base of the tongue forward, opens the airway, improves breathing for effective treat snoring and obstructive sleep apnea in perhaps 7 out of 10 people .  The best working devices are custom-made by a dental device  after a mold is made of the teeth 1, 2, 3.  When is an oral appliance indicated?  Oral appliance therapy is recommended as a first-line treatment for patients with primary snoring, mild sleep apnea, and for patients with moderate sleep apnea who prefer appliance therapy to use of CPAP4, 5. Severity of sleep apnea is determined by sleep testing and is based on the number of respiratory events per hour of sleep.   How successful is oral appliance therapy?  The success rate  of oral appliance therapy in patients with mild sleep apnea is 75-80% while in patients with moderate sleep apnea it is 50-70%. The chance of success in patients with severe sleep apnea is 40-50%. The research also shows that oral appliances have a beneficial effect on the cardiovascular health of RK patients at the same magnitude as CPAP therapy7.  Oral appliances should be a second-line treatment in cases of severe sleep apnea, but if not completely successful then a combination therapy utilizing CPAP plus oral appliance therapy may be effective. Oral appliances tend to be effective in a broad range of patients although studies show that the patients who have the highest success are females, younger patients, those with milder disease, and less severe obesity. 3, 6.   Finding a dentist that practices dental sleep medicine  Specific training is available through the American Academy of Dental Sleep Medicine for dentists interested in working in the field of sleep. To find a dentist who is educated in the field of sleep and the use of oral appliances, near you, visit the Web site of the American Academy of Dental Sleep Medicine.    References  1. Eliazar et al. Objectively measured vs self-reported compliance during oral appliance therapy for sleep-disordered breathing. Chest 2013; 144(5): 4867-4459.  2. Aidan et al. Objective measurement of compliance during oral appliance therapy for sleep-disordered breathing. Thorax 2013; 68(1): 91-96.  3. Malia et al. Mandibular advancement devices in 620 men and women with RK and snoring: tolerability and predictors of treatment success. Chest 2004; 125: 6229-1748.  4. Grayson, et al. Oral appliances for snoring and RK: a review. Sleep 2006; 29: 244-262.  5. Allen et al. Oral appliance treatment for RK: an update. J Clin Sleep Med 2014; 10(2): 215-227.  6. Shaun et al. Predictors of OSAH treatment outcome. J Dent Res 2007; 86: 7229-7252.  Weight  Loss:    Weight loss is a long-term strategy that may improve sleep apnea in some patients.    Weight management is a personal decision and the decision should be based on your interest and the potential benefits.  If you are interested in exploring weight loss strategies, the following discussion covers the impact on weight loss on sleep apnea and the approaches that may be successful.    Being overweight does not necessarily mean you will have health consequences.  Those who have BMI over 35 or over 27 with existing medical conditions carries greater risk.   Weight loss decreases severity of sleep apnea in most people with obesity. For those with mild obesity who have developed snoring with weight gain, even 15-30 pound weight loss can improve and occasionally eliminate sleep apnea.  Structured and life-long dietary and health habits are necessary to lose weight and keep healthier weight levels.     Though there may be significant health benefits from weight loss, long-term weight loss is very difficult to achieve- studies show success with dietary management in less than 10% of people. In addition, substantial weight loss may require years of dietary control and may be difficult if patients have severe obesity. In these cases, surgical management may be considered.  Finally, older individuals who have tolerated obesity without health complications may be less likely to benefit from weight loss strategies.      Your BMI is Body mass index is 34.58 kg/(m^2).  Weight management is a personal decision.  If you are interested in exploring weight loss strategies, the following discussion covers the approaches that may be successful. Body mass index (BMI) is one way to tell whether you are at a healthy weight, overweight, or obese. It measures your weight in relation to your height.  A BMI of 18.5 to 24.9 is in the healthy range. A person with a BMI of 25 to 29.9 is considered overweight, and someone with a BMI of 30 or  greater is considered obese. More than two-thirds of American adults are considered overweight or obese.  Being overweight or obese increases the risk for further weight gain. Excess weight may lead to heart disease and diabetes.  Creating and following plans for healthy eating and physical activity may help you improve your health.  Weight control is part of healthy lifestyle and includes exercise, emotional health, and healthy eating habits. Careful eating habits lifelong are the mainstay of weight control. Though there are significant health benefits from weight loss, long-term weight loss with diet alone may be very difficult to achieve- studies show long-term success with dietary management in less than 10% of people. Attaining a healthy weight may be especially difficult to achieve in those with severe obesity. In some cases, medications, devices and surgical management might be considered.  What can you do?  If you are overweight or obese and are interested in methods for weight loss, you should discuss this with your provider.     Consider reducing daily calorie intake by 500 calories.     Keep a food journal.     Avoiding skipping meals, consider cutting portions instead.    Diet combined with exercise helps maintain muscle while optimizing fat loss. Strength training is particularly important for building and maintaining muscle mass. Exercise helps reduce stress, increase energy, and improves fitness. Increasing exercise without diet control, however, may not burn enough calories to loose weight.       Start walking three days a week 10-20 minutes at a time    Work towards walking thirty minutes five days a week     Eventually, increase the speed of your walking for 1-2 minutes at time    In addition, we recommend that you review healthy lifestyles and methods for weight loss available through the National Institutes of Health patient information  sites:  http://win.niddk.nih.gov/publications/index.htm    And look into health and wellness programs that may be available through your health insurance provider, employer, local community center, or andrew club.    Weight management plan: Patient was referred to their PCP to discuss a diet and exercise plan.    Surgery:    Surgery for obstructive sleep apnea is considered generally only when other therapies fail to work. Surgery may be discussed with you if you are having a difficult time tolerating CPAP and or when there is an abnormal structure that requires surgical correction.  Nose and throat surgeries often enlarge the airway to prevent collapse.  Most of these surgeries create pain for 1-2 weeks and up to half of the most common surgeries are not effective throughout life.  You should carefully discuss the benefits and drawbacks to surgery with your sleep provider and surgeon to determine if it is the best solution for you.   More information  Surgery for RK is directed at areas that are responsible for narrowing or complete obstruction of the airway during sleep.  There are a wide range of procedures available to enlarge and/or stabilize the airway to prevent blockage of breathing in the three major areas where it can occur: the palate, tongue, and nasal regions.  Successful surgical treatment depends on the accurate identification of the factors responsible for obstructive sleep apnea in each person.  A personalized approach is required because there is no single treatment that works well for everyone.  Because of anatomic variation, consultation with an examination by a sleep surgeon is a critical first step in determining what surgical options are best for each patient.  In some cases, examination during sedation may be recommended in order to guide the selection of procedures.  Patients will be counseled about risks and benefits as well as the typical recovery course after surgery. Surgery is typically  not a cure for a person s RK.  However, surgery will often significantly improve one s RK severity (termed  success rate ).  Even in the absence of a cure, surgery will decrease the cardiovascular risk associated with OSA7; improve overall quality of life8 (sleepiness, functionality, sleep quality, etc).  Palate Procedures:  Patients with RK often have narrowing of their airway in the region of their tonsils and uvula.  The goals of palate procedures are to widen the airway in this region as well as to help the tissues resist collapse.  Modern palate procedure techniques focus on tissue conservation and soft tissue rearrangement, rather than tissue removal.  Often the uvula is preserved in this procedure. Residual sleep apnea is common in patient after pharyngoplasty with an average reduction in sleep apnea events of 33%2.    Tongue Procedures:  ExamWhile patients are awake, the muscles that surround the throat are active and keep this region open for breathing. These muscles relax during sleep, allowing the tongue and other structures to collapse and block breathing.  There are several different tongue procedures available.  Selection of a tongue base procedure depends on characteristics seen on physical exam.  Generally, procedures are aimed at removing bulky tissues in this area or preventing the back of the tongue from falling back during sleep.  Success rates for tongue surgery range from 50-62%3.  Hypoglossal Nerve Stimulation:  Hypoglossal nerve stimulation has recently received approval from the [place] Food and Drug Administration for the treatment of obstructive sleep apnea.  This is based on research showing that the system was safe and effective in treating sleep apnea6.  Results showed that the median AHI score decreased 68%, from 29.3 to 9.0. This therapy uses an implant system that senses breathing patterns and delivers mild stimulation to airway muscles, which keeps the airway open during  sleep.  The system consists of three fully implanted components: a small generator (similar in size to a pacemaker), a breathing sensor, and a stimulation lead.  Using a small handheld remote, a patient turns the therapy on before bed and off upon awakening.    Candidates for this device must be greater than 22 years of age, have moderate to severe RK (AHI between 20-65), BMI less than 32, have tried CPAP/oral appliance without success, and have appropriate upper airway anatomy (determined by a sleep endoscopy performed by Dr. Schafer).  Hypoglossal Nerve Stimulation Pathway:    The sleep surgeon s office will work with the patient through the insurance prior-authorization process (including communications and appeals).    Nasal Procedures:  Nasal obstruction can interfere with nasal breathing during the day and night.  Studies have shown that relief of nasal obstruction can improve the ability of some patients to tolerate positive airway pressure therapy for obstructive sleep apnea1.  Treatment options include medications such as nasal saline, topical corticosteroid and antihistamine sprays, and oral medications such as antihistamines or decongestants. Non-surgical treatments can include external nasal dilators for selected patients. If these are not successful by themselves, surgery can improve the nasal airway either alone or in combination with these other options.  Combination Procedures:  Combination of surgical procedures and other treatments may be recommended, particularly if patients have more than one area of narrowing or persistent positional disease.  The success rate of combination surgery ranges from 66-80%2,3.    References  1. Darlene SHAW. The Role of the Nose in Snoring and Obstructive Sleep Apnoea: An Update.  Eur Arch Otorhinolaryngol. 2011; 268: 1365-73.  2.  Lexie SM; Laurie JA; Dany JR; Pallanch JF; Hilda BANGURA; Janett ARCINIEGA; Gautam MAKI. Surgical modifications of the upper airway for obstructive  sleep apnea in adults: a systematic review and meta-analysis. SLEEP 2010;33(10):3581-7306. John DAWSON. Hypopharyngeal surgery in obstructive sleep apnea: an evidence-based medicine review.  Arch Otolaryngol Head Neck Surg. 2006 Feb;132(2):206-13.  3. Gilles YH1, Kevin Y, Robinson PALMA. The efficacy of anatomically based multilevel surgery for obstructive sleep apnea. Otolaryngol Head Neck Surg. 2003 Oct;129(4):327-35.  4. Kezirian E, Goldberg A. Hypopharyngeal Surgery in Obstructive Sleep Apnea: An Evidence-Based Medicine Review. Arch Otolaryngol Head Neck Surg. 2006 Feb;132(2):206-13.  5. Karol MELLO et al. Upper-Airway Stimulation for Obstructive Sleep Apnea.  N Engl J Med. 2014 Jan 9;370(2):139-49.  6. Blanca Y et al. Increased Incidence of Cardiovascular Disease in Middle-aged Men with Obstructive Sleep Apnea. Am J Respir Crit Care Med; 2002 166: 159-165  7. Harry EM et al. Studying Life Effects and Effectiveness of Palatopharyngoplasty (SLEEP) study: Subjective Outcomes of Isolated Uvulopalatopharyngoplasty. Otolaryngol Head Neck Surg. 2011; 144: 623-631.    Instructions for treating Delayed Sleep Phase Syndrome:    Delayed Sleep Phase Syndrome (DSPS) means that your body's internal timing is set late compared to the 24 hour day. Therefore, it is often difficult to get up on time for work in the morning and sometimes difficult to fall asleep on time, in order to get enough sleep. People with DSPS often tend to like to stay up late on weekends and sleep in until between 10 AM and noon, sometimes even later.This is actually a bad habit that will perpetuate the problem. It reinforces your body's tendency to be on that later schedule.    You should go to bed when you are sleepy and ready to sleep. During this entire process, you should not engage in activities that may make it worse, such as watching TV in bed, leaving the TV on all night, drinking any caffeine 6 hours before bed or exercising 1-2 hours before bed.      Start taking Melatonin, 1 mg tablet 3-5 hours before the time that you fall asleep on average (not your desired bedtime or time that you get in bed, but the time you normally fall asleep on your own).     Upon awakening, get exposure to sun-light for about 30-45 minutes. You do not need to look at the sun, in fact, this is dangerous. Reading the paper with the sun shining on you is adequate.  Alternatively, you may use a Seasonal Affective Disorder Lamp (intensity 10,000 Lux) instead of the sun. The lamp should be positioned 1-2 arms lengths away from you. They lamps are sold at [facility] [email or web address] such as [employer] or [employer]. A prescription can be written to get insurance coverage in some cases. They are also sold on [employer].com.    Using the light and melatonin should help march your internal clock (known as your circadian rhythms) gradually earlier. As your bedtime advances, remember to take your melatonin earlier, keeping it 5 hours before your fall asleep time.    Avoid naps and sleeping in because sleeping during the day will delay your body's clock and you will have to start from scratch.     More information about light therapy:  If the cost of any light box is too much, you can also purchase a compact fluorescent all spectrum light bulb at a local hardware store for about $8.  The most commonly available bulb is 1400 lumen.  You would need two of these positioned within 1 meter of yourself to be equivalent to 2,500 lux.  The bulbs can be placed in a standard light fixture.  Additionally, they can be placed in a mountable fixture that is used in greenhouses.  Mountable fixtures are also available at hardware stores for about $9.  Do not look directly at the light.  If you have any concerns regarding the safety of bright light therapy for you, it is recommended that you consult an ophthalmologist before using a light box.  If you have a condition that makes your eyes very sensitive  to light, macular degeneration, a family history of such problems, or diabetic changes to your eyes, consult an ophthalmologist before using a light box. If you have anxiety disorder and have an increase in anxiety discontinue use.

## 2018-04-12 NOTE — PROGRESS NOTES
Sleep Consultation:    Date on this visit: 4/12/2018    Alexis Piña is sent by Tray Monroe for a sleep consultation regarding sleep apnea.    Primary Physician: Tray Monroe     Alexis Piña reports excessive snoring for most of his adult life and observed apnea for years. His medical history is significant for obesity (BMI 33), LONNY, prediabetes.    Alexis goes to sleep at 12:30 AM during the week. He wakes up at 8:30 AM to his kids. He sometimes wakes when his wife wakes at 6 AM. He falls asleep in 30 minutes.  Alexis has difficulty falling asleep.  He wakes up 4 times a night for 15 minutes before falling back to sleep.  Alexis wakes up to go to the bathroom and snoring.  On weekends, his sleep schedule is the same.  Patient gets an average of 6 hours of sleep per night. He sometimes takes a very small dose of diphenhydramine. It will increase anxiety if he takes a full dose of antihistamines or trazodone.     Patient does not use electronics in bed, watch TV in bed, worry in bed about anything and read in bed.     Alexis does not do shift work.  He works day shifts.  He works as an  for [employer]. He lives with his wife and 2 children.     Alexis does snore frequently and snoring is loud. Patient does have a regular bed partner. There is report of gasping and snorting.  He does have witnessed apneas. They occasionally sleep separately sometimes due to snoring, sometimes because his son goes to their bed.  Patient sleeps on his back and side. He may wake with a headache 0-2 times per week. He has occasional snort arousals, denies morning dry mouth, morning confusion and restless legs. Alexis denies any bruxism, sleep walking, sleep talking, dream enactment, sleep paralysis, cataplexy and hypnogogic/hypnopompic hallucinations.    Alexis has reflux at night, heartburn (silent reflux, but wakes feeling hot, sweaty and anxious, omeprazole every other day helps), depression and anxiety, denies difficulty  breathing through his nose and claustrophobia.      Alexis has gained 20-30 pounds in 5 years.  Patient describes themself as a night person.  He would prefer to go to sleep at 2:00 AM and wake up at 9-10:00 AM.  Patient's [place] Sleepiness score 5/24 inconsistent with daytime sleepiness.      Alexis naps 2 times per week (weekends) for 30 minutes, feels refreshed after naps. He takes no inadvertant naps.  He denies dozing while driving.  Patient was counseled on the importance of driving while alert, to pull over if drowsy, or nap before getting into the vehicle if sleepy.  He uses 1 cups/day of coffee, 0-1 sodas/day. Last caffeine intake is usually with lunch.    Allergies:    Allergies   Allergen Reactions     Amoxicillin [Penicillins] Hives     Doxy [Doxycycline Hyclate] Hives       Medications:    Current Outpatient Prescriptions   Medication Sig Dispense Refill     citalopram (CELEXA) 10 MG tablet Take 1 tablet (10 mg) by mouth daily 90 tablet 3     omeprazole (PRILOSEC) 20 MG CR capsule Take 1 capsule (20 mg) by mouth daily 90 capsule 3     SUMAtriptan (IMITREX) 50 MG tablet Take 1 tablet (50 mg) by mouth at onset of headache for migraine May repeat dose in 2 hours.  Do not exceed 200 mg in 24 hours 18 tablet 3     allopurinol (ZYLOPRIM) 300 MG tablet Take 1 tablet (300 mg) by mouth daily Due for labwork 90 tablet 1     mometasone (ELOCON) 0.1 % ointment Apply sparingly to affected area. 30 g 0       Problem List:  Patient Active Problem List    Diagnosis Date Noted     LONNY (generalized anxiety disorder) 09/19/2016     Priority: Medium     Hyperuricemia 06/25/2015     Priority: Medium     Obesity 09/03/2014     Priority: Medium     Problem list name updated by automated process. Provider to review       Prediabetes 12/19/2011     Priority: Medium     Lumbago 03/08/2011     Priority: Medium     HYPERLIPIDEMIA LDL GOAL <160 02/10/2010     Priority: Medium     Seborrhea      Priority: Medium        Past  Medical/Surgical History:  Past Medical History:   Diagnosis Date     Pure hypercholesterolemia      Seborrhea      Tobacco use disorder      Vitamin D deficiency 12/22/2011     No past surgical history on file.    Social History:  Social History     Social History     Marital status:      Spouse name: N/A     Number of children: N/A     Years of education: N/A     Occupational History     Not on file.     Social History Main Topics     Smoking status: Former Smoker     Smokeless tobacco: Never Used      Comment: quit 12/03/2005     Alcohol use 20.0 oz/week     Drug use: No     Sexual activity: Yes     Partners: Female     Other Topics Concern     Parent/Sibling W/ Cabg, Mi Or Angioplasty Before 65f 55m? No     Social History Narrative       Family History:  Family History   Problem Relation Age of Onset     Neurologic Disorder Mother      MS     DIABETES Paternal Grandmother      C.A.D. No family hx of      Hypertension No family hx of      Cancer - colorectal No family hx of      Prostate Cancer No family hx of      CANCER Father      father diagnose with bladder cancer at age 61 yrs old       Review of Systems:  A complete review of systems reviewed by me is negative with the exeption of what has been mentioned in the history of present illness.  CONSTITUTIONAL: NEGATIVE for weight loss, fever, chills, sweats or night sweats.  CONSTITUTIONAL:  POSITIVE for  weight gain and drug allergies   EYES: NEGATIVE for changes in vision, blind spots, double vision.  ENT: NEGATIVE for ear pain, sore throat, sinus pain, post-nasal drip, runny nose, bloody nose  CARDIAC: NEGATIVE for chest pain or pressure, breathlessness when lying flat, swollen legs or swollen feet.  CARDIAC:  POSITIVE for  fast heart beats and fluttering in chest- with intense exercise  NEUROLOGIC: NEGATIVE weakness or numbness in the arms or legs.  NEUROLOGIC:  POSITIVE for  headaches  DERMATOLOGIC: NEGATIVE for rashes, new moles or change in  "mole(s)  PULMONARY: NEGATIVE SOB at rest, SOB with activity, dry cough, productive cough, coughing up blood, wheezing or whistling when breathing.    GASTROINTESTINAL: NEGATIVE for nausea or vomitting, loose or watery stools, fat or grease in stools, constipation, abdominal pain, bowel movements black in color or blood noted.  GENITOURINARY: NEGATIVE for pain during urination, blood in urine, irregular menstrual periods.  GENITOURINARY:  POSITIVE for  urinating more frequently than usual  MUSCULOSKELETAL: NEGATIVE for muscle pain, bone or joint pain, swollen joints.  MUSCULOSKELETAL:  POSITIVE for  Low back pain  ENDOCRINE: NEGATIVE for increased thirst or urination, diabetes.  LYMPHATIC: NEGATIVE for swollen lymph nodes, lumps or bumps in the breasts or nipple discharge.  MENTAL HEALTH: POSITIVE for depression and anxiety    Physical Examination:  Vitals: /85  Pulse 85  Resp 16  Ht 1.778 m (5' 10\")  Wt 109.3 kg (241 lb)  SpO2 97%  BMI 34.58 kg/m2  Neck Circumference: 48 cm.        [place] Total Score 4/12/2018   Total score - [place] 5       GENERAL APPEARANCE: healthy, alert, no distress and cooperative  EYES: Eyes grossly normal to inspection, PERRL, conjunctivae and sclerae normal and lids and lashes normal  HENT: nose and mouth without ulcers or lesions, oropharynx crowded, soft palate dependent and tongue base enlarged  NECK: no adenopathy, no asymmetry, masses, or scars, thyroid normal to palpation and trachea midline and normal to palpation  RESP: lungs clear to auscultation - no rales, rhonchi or wheezes  CV: regular rates and rhythm, normal S1 S2, no S3 or S4, no irregular beats and grade 2/6 systolic murmur heard best over the upper right sternal border just at the end of exhalation  LYMPHATICS: no cervical adenopathy  MS: extremities normal- no gross deformities noted  NEURO: Normal strength and tone, mentation intact, speech normal and cranial nerves 2-12 intact  Mallampati Class: " IV.  Tonsillar Stage: 1  hidden by pillars.    Impression/Plan:    (G47.30) Observed sleep apnea  (primary encounter diagnosis), (R06.83) Snoring, (Z68.34) BMI 34.0-34.9,adult  Comment: Alexis presents with concerns for RK. He has snored most of his adult life. It is loud and nightly. He has also had consistent observed pauses in breathing that seems independent of position. He has gained 20-30 pounds in the last 5 years and his symptoms have worsened in that time. He says he could probably nap every day if he had the time, but denies inadvertent dozing. His ESS is normal at 5/24. He does not have HTN, but his BP has been borderline lately. STOP BANG TOTAL: 4- snoring, observed apnea, neck 48 cm, male. BMI 34.5. Age is 37.  Plan: HST-Home Sleep Apnea Test          (G47.21) Delayed sleep phase syndrome  Comment: He has been trying to keep his schedule to 12:30-8:30 AM. He thinks he would naturally sleep 2-10 AM.  Plan: We discussed getting 30 minutes of bright light exposure in the morning, either with the sun or a SAD Lamp of 10,000 lux intensity. Avoid bright light, including electronics, in the hour before bedtime. Take 1 mg melatonin 3-5 hours prior to natural sleep onset. Keep a consistent sleep schedule, avoiding naps and sleeping in.        Literature provided regarding sleep apnea.      He will follow up with me in approximately two weeks after his sleep study has been competed to review the results and discuss plan of care.       Polysomnography reviewed.  Obstructive sleep apnea reviewed.  Complications of untreated sleep apnea were reviewed.  45 minutes was spent during this visit, over 50% in counseling and coordination of care.   Bennett Goltz, PA-C    CC: Tray Monroe

## 2018-04-12 NOTE — MR AVS SNAPSHOT
"              After Visit Summary   4/12/2018    Alexis Piña    MRN: [id number]           Patient Information     Date Of Birth          1980        Visit Information        Provider Department      4/12/2018 2:00 PM Goltz, Bennett Ezra, PA-C [facility]        Today's Diagnoses     Observed sleep apnea    -  1    Snoring        BMI 34.0-34.9,adult        Delayed sleep phase syndrome          Care Instructions    MY TREATMENT INFORMATION FOR SLEEP APNEA-  Alexis Piña    DOCTOR : Bennett Ezra Goltz, PA-C  SLEEP CENTER : [facility]     MY CONTACT NUMBER: [phone]    Am I having a sleep study at a sleep center?  Make sure you have an appointment for the study before you leave!    Am I having a home sleep study?  Watch this video:  https://www.[employer].com/watch?v=CteI_GhyP9g&[email or web address]    Frequently asked questions:  1. What is Obstructive Sleep Apnea (RK)? RK is the most common type of sleep apnea. Apnea means, \"without breath.\"  Apnea is most often caused by narrowing or collapse of the upper airway as muscles relax during sleep.   Almost everyone has occasional apneas. Most people with sleep apnea have had brief interruptions at night frequently for many years.  The severity of sleep apnea is related to how frequent and severe the events are.   2. What are the consequences of RK? Symptoms include: feeling sleepy during the day, snoring loudly, gasping or stopping of breathing, trouble sleeping, and occasionally morning headaches or heartburn at night.  Sleepiness can be serious and even increase the risk of falling asleep while driving. Other health consequences may include development of high blood pressure and other cardiovascular disease in persons who are susceptible. Untreated RK  can contribute to heart disease, stroke and diabetes.   3. What are the treatment options? In most situations, sleep apnea is a lifelong disease that must be managed with daily therapy. " Medications are not effective for sleep apnea and surgery is generally not considered until other therapies have been tried. Your treatment is your choice . Continuous Positive Airway (CPAP) works right away and is the therapy that is effective in nearly everyone. An oral device to hold your jaw forward is usually the next most reliable option. Other options include postioning devices (to keep you off your back), weight loss, and surgery including a tongue pacing device. There is more detail about some of these options below.    Important tips for using CPAP and similar devices   Know your equipment:  CPAP is continuous positive airway pressure that prevents obstructive sleep apnea by keeping the throat from collapsing while you are sleeping. In most cases, the device is  smart  and can slowly self-adjusts if your throat collapses and keeps a record every day of how well you are treated-this information is available to you and your care team.  BPAP is bilevel positive airway pressure that keeps your throat open and also assists each breath with a pressure boost to maintain adequate breathing.  Special kinds of BPAP are used in patients who have inadequate breathing from lung or heart disease. In most cases, the device is  smart  and can slowly self-adjusts to assist breathing. Like CPAP, the device keeps a record of how well you are treated.  Your mask is your connection to the device. You get to choose what feels most comfortable and the staff will help to make sure if fits. Here: are some examples of the different masks that are available:       Key points to remember on your journey with sleep apnea:  1. Sleep study.  PAP devices often need to be adjusted during a sleep study to show that they are effective and adjusted right.  2. Good tips to remember: Try wearing just the mask during a quiet time during the day so your body adapts to wearing it. A humidifier is recommended for comfort in most cases to prevent  drying of your nose and throat. Allergy medication from your provider may help you if you are having nasal congestion.  3. Getting settled-in. It takes more than one night for most of us to get used to wearing a mask. Try wearing just the mask during a quiet time during the day so your body adapts to wearing it. A humidifier is recommended for comfort in most cases. Our team will work with you carefully on the first day and will be in contact within 4 days and again at 2 and 4 weeks for advice and remote device adjustments. Your therapy is evaluated by the device each day.   4. Use it every night. The more you are able to sleep naturally for 7-8 hours, the more likely you will have good sleep and to prevent health risks or symptoms from sleep apnea. Even if you use it 4 hours it helps. Occasionally all of us are unable to use a medical therapy, in sleep apnea, it is not dangerous to miss one night.   5. Communicate. Call our skilled team on the number provided on the first day if your visit for problems that make it difficult to wear the device. Over 2 out of 3 patients can learn to wear the device long-term with help from our team. Remember to call our team or your sleep providers if you are unable to wear the device as we may have other solutions for those who cannot adapt to mask CPAP therapy. It is recommended that you sleep your sleep provider within the first 3 months and yearly after that if you are not having problems.   Take care of your equipment. Make sure you clean your mask and tubing using directions every day and that your filter and mask are replaced as recommended or if they are not working.     BESIDES CPAP, WHAT OTHER THERAPIES ARE THERE?  Positioning Device  Positioning devices are generally used when sleep apnea is mild and only occurs on your back.This example shows a pillow that straps around the waist. It may be appropriate for those whose sleep study shows milder sleep apnea that occurs  primarily when lying flat on one's back. Preliminary studies have shown benefit but effectiveness at home may need to be verified by a home sleep test. These devices are generally not covered by medical insurance.  Examples of devices that maintain sleeping on the back to prevent snoring and mild sleep apnea.    Belt type body positioner  Http://CoreFlow.Datamyne/    Electronic reminder  Http://nightshifttherapy.com/  Http://www.[employer]Datamyne.au/    Oral Appliance  What is oral appliance therapy?  An oral appliance device fits on your teeth at night like a retainer used after having braces. The device is made by a specialized dentist and requires several visits over 1-2 months before a manufactured device is made to fit your teeth and is adjusted to prevent your sleep apnea. Once an oral device is working properly, snoring should be improved. A home sleep test may be recommended at that time if to determine whether the sleep apnea is adequately treated.       Some things to remember:  -Oral devices are often, but not always, covered by your medical insurance. Be sure to check with your insurance provider.   -If you are referred for oral therapy, you will be given a list of specialized dentists to consider or you may choose to visit the Web site of the American Academy of Dental Sleep Medicine  -Oral devices are less likely to work if you have severe sleep apnea or are extremely overweight.     More detailed information  An oral appliance is a small acrylic device that fits over the upper and lower teeth  (similar to a retainer or a mouth guard). This device slightly moves jaw forward, which moves the base of the tongue forward, opens the airway, improves breathing for effective treat snoring and obstructive sleep apnea in perhaps 7 out of 10 people .  The best working devices are custom-made by a dental device  after a mold is made of the teeth 1, 2, 3.  When is an oral appliance indicated?  Oral appliance  therapy is recommended as a first-line treatment for patients with primary snoring, mild sleep apnea, and for patients with moderate sleep apnea who prefer appliance therapy to use of CPAP4, 5. Severity of sleep apnea is determined by sleep testing and is based on the number of respiratory events per hour of sleep.   How successful is oral appliance therapy?  The success rate of oral appliance therapy in patients with mild sleep apnea is 75-80% while in patients with moderate sleep apnea it is 50-70%. The chance of success in patients with severe sleep apnea is 40-50%. The research also shows that oral appliances have a beneficial effect on the cardiovascular health of RK patients at the same magnitude as CPAP therapy7.  Oral appliances should be a second-line treatment in cases of severe sleep apnea, but if not completely successful then a combination therapy utilizing CPAP plus oral appliance therapy may be effective. Oral appliances tend to be effective in a broad range of patients although studies show that the patients who have the highest success are females, younger patients, those with milder disease, and less severe obesity. 3, 6.   Finding a dentist that practices dental sleep medicine  Specific training is available through the American Academy of Dental Sleep Medicine for dentists interested in working in the field of sleep. To find a dentist who is educated in the field of sleep and the use of oral appliances, near you, visit the Web site of the American Academy of Dental Sleep Medicine.    References  1. Eliazar et al. Objectively measured vs self-reported compliance during oral appliance therapy for sleep-disordered breathing. Chest 2013; 144(5): 6330-9230.  2. Aidan et al. Objective measurement of compliance during oral appliance therapy for sleep-disordered breathing. Thorax 2013; 68(1): 91-96.  3. Malia et al. Mandibular advancement devices in 620 men and women with RK and snoring:  tolerability and predictors of treatment success. Chest 2004; 125: 6599-2194.  4. Grayson et al. Oral appliances for snoring and RK: a review. Sleep 2006; 29: 244-262.  5. Allen et al. Oral appliance treatment for RK: an update. J Clin Sleep Med 2014; 10(2): 215-227.  6. Shaun et al. Predictors of OSAH treatment outcome. J Dent Res 2007; 86: 7540-4741.  Weight Loss:    Weight loss is a long-term strategy that may improve sleep apnea in some patients.    Weight management is a personal decision and the decision should be based on your interest and the potential benefits.  If you are interested in exploring weight loss strategies, the following discussion covers the impact on weight loss on sleep apnea and the approaches that may be successful.    Being overweight does not necessarily mean you will have health consequences.  Those who have BMI over 35 or over 27 with existing medical conditions carries greater risk.   Weight loss decreases severity of sleep apnea in most people with obesity. For those with mild obesity who have developed snoring with weight gain, even 15-30 pound weight loss can improve and occasionally eliminate sleep apnea.  Structured and life-long dietary and health habits are necessary to lose weight and keep healthier weight levels.     Though there may be significant health benefits from weight loss, long-term weight loss is very difficult to achieve- studies show success with dietary management in less than 10% of people. In addition, substantial weight loss may require years of dietary control and may be difficult if patients have severe obesity. In these cases, surgical management may be considered.  Finally, older individuals who have tolerated obesity without health complications may be less likely to benefit from weight loss strategies.      Your BMI is Body mass index is 34.58 kg/(m^2).  Weight management is a personal decision.  If you are interested in exploring weight  loss strategies, the following discussion covers the approaches that may be successful. Body mass index (BMI) is one way to tell whether you are at a healthy weight, overweight, or obese. It measures your weight in relation to your height.  A BMI of 18.5 to 24.9 is in the healthy range. A person with a BMI of 25 to 29.9 is considered overweight, and someone with a BMI of 30 or greater is considered obese. More than two-thirds of American adults are considered overweight or obese.  Being overweight or obese increases the risk for further weight gain. Excess weight may lead to heart disease and diabetes.  Creating and following plans for healthy eating and physical activity may help you improve your health.  Weight control is part of healthy lifestyle and includes exercise, emotional health, and healthy eating habits. Careful eating habits lifelong are the mainstay of weight control. Though there are significant health benefits from weight loss, long-term weight loss with diet alone may be very difficult to achieve- studies show long-term success with dietary management in less than 10% of people. Attaining a healthy weight may be especially difficult to achieve in those with severe obesity. In some cases, medications, devices and surgical management might be considered.  What can you do?  If you are overweight or obese and are interested in methods for weight loss, you should discuss this with your provider.     Consider reducing daily calorie intake by 500 calories.     Keep a food journal.     Avoiding skipping meals, consider cutting portions instead.    Diet combined with exercise helps maintain muscle while optimizing fat loss. Strength training is particularly important for building and maintaining muscle mass. Exercise helps reduce stress, increase energy, and improves fitness. Increasing exercise without diet control, however, may not burn enough calories to loose weight.       Start walking three days a week  10-20 minutes at a time    Work towards walking thirty minutes five days a week     Eventually, increase the speed of your walking for 1-2 minutes at time    In addition, we recommend that you review healthy lifestyles and methods for weight loss available through the National Institutes of Health patient information sites:  http://win.niddk.nih.gov/publications/index.htm    And look into health and wellness programs that may be available through your health insurance provider, employer, local community center, or andrew club.    Weight management plan: Patient was referred to their PCP to discuss a diet and exercise plan.    Surgery:    Surgery for obstructive sleep apnea is considered generally only when other therapies fail to work. Surgery may be discussed with you if you are having a difficult time tolerating CPAP and or when there is an abnormal structure that requires surgical correction.  Nose and throat surgeries often enlarge the airway to prevent collapse.  Most of these surgeries create pain for 1-2 weeks and up to half of the most common surgeries are not effective throughout life.  You should carefully discuss the benefits and drawbacks to surgery with your sleep provider and surgeon to determine if it is the best solution for you.   More information  Surgery for RK is directed at areas that are responsible for narrowing or complete obstruction of the airway during sleep.  There are a wide range of procedures available to enlarge and/or stabilize the airway to prevent blockage of breathing in the three major areas where it can occur: the palate, tongue, and nasal regions.  Successful surgical treatment depends on the accurate identification of the factors responsible for obstructive sleep apnea in each person.  A personalized approach is required because there is no single treatment that works well for everyone.  Because of anatomic variation, consultation with an examination by a sleep surgeon is a  critical first step in determining what surgical options are best for each patient.  In some cases, examination during sedation may be recommended in order to guide the selection of procedures.  Patients will be counseled about risks and benefits as well as the typical recovery course after surgery. Surgery is typically not a cure for a person s RK.  However, surgery will often significantly improve one s RK severity (termed  success rate ).  Even in the absence of a cure, surgery will decrease the cardiovascular risk associated with OSA7; improve overall quality of life8 (sleepiness, functionality, sleep quality, etc).  Palate Procedures:  Patients with RK often have narrowing of their airway in the region of their tonsils and uvula.  The goals of palate procedures are to widen the airway in this region as well as to help the tissues resist collapse.  Modern palate procedure techniques focus on tissue conservation and soft tissue rearrangement, rather than tissue removal.  Often the uvula is preserved in this procedure. Residual sleep apnea is common in patient after pharyngoplasty with an average reduction in sleep apnea events of 33%2.    Tongue Procedures:  ExamWhile patients are awake, the muscles that surround the throat are active and keep this region open for breathing. These muscles relax during sleep, allowing the tongue and other structures to collapse and block breathing.  There are several different tongue procedures available.  Selection of a tongue base procedure depends on characteristics seen on physical exam.  Generally, procedures are aimed at removing bulky tissues in this area or preventing the back of the tongue from falling back during sleep.  Success rates for tongue surgery range from 50-62%3.  Hypoglossal Nerve Stimulation:  Hypoglossal nerve stimulation has recently received approval from the [place] Food and Drug Administration for the treatment of obstructive sleep apnea.  This  is based on research showing that the system was safe and effective in treating sleep apnea6.  Results showed that the median AHI score decreased 68%, from 29.3 to 9.0. This therapy uses an implant system that senses breathing patterns and delivers mild stimulation to airway muscles, which keeps the airway open during sleep.  The system consists of three fully implanted components: a small generator (similar in size to a pacemaker), a breathing sensor, and a stimulation lead.  Using a small handheld remote, a patient turns the therapy on before bed and off upon awakening.    Candidates for this device must be greater than 22 years of age, have moderate to severe RK (AHI between 20-65), BMI less than 32, have tried CPAP/oral appliance without success, and have appropriate upper airway anatomy (determined by a sleep endoscopy performed by Dr. Schafer).  Hypoglossal Nerve Stimulation Pathway:    The sleep surgeon s office will work with the patient through the insurance prior-authorization process (including communications and appeals).    Nasal Procedures:  Nasal obstruction can interfere with nasal breathing during the day and night.  Studies have shown that relief of nasal obstruction can improve the ability of some patients to tolerate positive airway pressure therapy for obstructive sleep apnea1.  Treatment options include medications such as nasal saline, topical corticosteroid and antihistamine sprays, and oral medications such as antihistamines or decongestants. Non-surgical treatments can include external nasal dilators for selected patients. If these are not successful by themselves, surgery can improve the nasal airway either alone or in combination with these other options.  Combination Procedures:  Combination of surgical procedures and other treatments may be recommended, particularly if patients have more than one area of narrowing or persistent positional disease.  The success rate of combination surgery  ranges from 66-80%2,3.    References  1. Darlene SHAW. The Role of the Nose in Snoring and Obstructive Sleep Apnoea: An Update.  Eur Arch Otorhinolaryngol. 2011; 268: 1365-73.  2.  Lexie SM; Laurie JA; Dany JR; Pallanch JF; Hilda MB; Janett SG; Gautam MAKI. Surgical modifications of the upper airway for obstructive sleep apnea in adults: a systematic review and meta-analysis. SLEEP 2010;33(10):6640-6545. John DAWSON. Hypopharyngeal surgery in obstructive sleep apnea: an evidence-based medicine review.  Arch Otolaryngol Head Neck Surg. 2006 Feb;132(2):206-13.  3. Gilles YH1, Kevin Y, Robinson PALMA. The efficacy of anatomically based multilevel surgery for obstructive sleep apnea. Otolaryngol Head Neck Surg. 2003 Oct;129(4):327-35.  4. John DAWSON, Goldberg A. Hypopharyngeal Surgery in Obstructive Sleep Apnea: An Evidence-Based Medicine Review. Arch Otolaryngol Head Neck Surg. 2006 Feb;132(2):206-13.  5. Williamo PJ et al. Upper-Airway Stimulation for Obstructive Sleep Apnea.  N Engl J Med. 2014 Jan 9;370(2):139-49.  6. Blanca Y et al. Increased Incidence of Cardiovascular Disease in Middle-aged Men with Obstructive Sleep Apnea. Am J Respir Crit Care Med; 2002 166: 159-165  7. Mcdonald EM et al. Studying Life Effects and Effectiveness of Palatopharyngoplasty (SLEEP) study: Subjective Outcomes of Isolated Uvulopalatopharyngoplasty. Otolaryngol Head Neck Surg. 2011; 144: 623-631.    Instructions for treating Delayed Sleep Phase Syndrome:    Delayed Sleep Phase Syndrome (DSPS) means that your body's internal timing is set late compared to the 24 hour day. Therefore, it is often difficult to get up on time for work in the morning and sometimes difficult to fall asleep on time, in order to get enough sleep. People with DSPS often tend to like to stay up late on weekends and sleep in until between 10 AM and noon, sometimes even later.This is actually a bad habit that will perpetuate the problem. It reinforces your body's tendency to  be on that later schedule.    You should go to bed when you are sleepy and ready to sleep. During this entire process, you should not engage in activities that may make it worse, such as watching TV in bed, leaving the TV on all night, drinking any caffeine 6 hours before bed or exercising 1-2 hours before bed.     Start taking Melatonin, 1 mg tablet 3-5 hours before the time that you fall asleep on average (not your desired bedtime or time that you get in bed, but the time you normally fall asleep on your own).     Upon awakening, get exposure to sun-light for about 30-45 minutes. You do not need to look at the sun, in fact, this is dangerous. Reading the paper with the sun shining on you is adequate.  Alternatively, you may use a Seasonal Affective Disorder Lamp (intensity 10,000 Lux) instead of the sun. The lamp should be positioned 1-2 arms lengths away from you. They lamps are sold at [facility] such as [employer] or [employer]. A prescription can be written to get insurance coverage in some cases. They are also sold on Amazon.com.    Using the light and melatonin should help march your internal clock (known as your circadian rhythms) gradually earlier. As your bedtime advances, remember to take your melatonin earlier, keeping it 5 hours before your fall asleep time.    Avoid naps and sleeping in because sleeping during the day will delay your body's clock and you will have to start from scratch.     More information about light therapy:  If the cost of any light box is too much, you can also purchase a compact fluorescent all spectrum light bulb at a local hardware store for about $8.  The most commonly available bulb is 1400 lumen.  You would need two of these positioned within 1 meter of yourself to be equivalent to 2,500 lux.  The bulbs can be placed in a standard light fixture.  Additionally, they can be placed in a mountable fixture that is used in greenhouses.  Mountable  fixtures are also available at [employer] for about $9.  Do not look directly at the light.  If you have any concerns regarding the safety of bright light therapy for you, it is recommended that you consult an ophthalmologist before using a light box.  If you have a condition that makes your eyes very sensitive to light, macular degeneration, a family history of such problems, or diabetic changes to your eyes, consult an ophthalmologist before using a light box. If you have anxiety disorder and have an increase in anxiety discontinue use.           Follow-ups after your visit        Follow-up notes from your care team     Return in about 2 weeks (around 4/26/2018) for Sleep Study Review.      Your next 10 appointments already scheduled     Apr 18, 2018  2:00 PM CDT   HST  with BED 7  SLEEP   [facility] ([facility])    [address]  [place] [address]   [phone]            Apr 19, 2018  9:15 AM CDT   HST Drop Off with  SLEEP CENTER DME   [facility] ([facility])    [address]  [place] [address] [phone]            Apr 19, 2018 11:15 AM CDT   New Visit with Derek Rowley MD   [facility] ([facility])    [address]  [place] [address] [phone] OPT 2            Apr 23, 2018 11:30 AM CDT   (Arrive by 11:15 AM)   MyChart Chiro Initial with JOHNNY Torres CHIRO (VINI Monique  )    [address]  [address]  [place] [address] [phone]            Apr 26, 2018  3:00 PM CDT   Return Sleep Patient with Bennett Ezra Goltz, PA-C   [facility] ([facility])    [address]  [place] [address] [phone]              Future tests that were ordered for you today     Open Future Orders        Priority  "Expected Expires Ordered    HST-Home Sleep Apnea Test Routine  10/12/2018 4/12/2018            Who to contact     If you have questions or need follow up information about today's clinic visit or your schedule please contact [facility] directly at [phone].  Normal or non-critical lab and imaging results will be communicated to you by MyChart, letter or phone within 4 business days after the clinic has received the results. If you do not hear from us within 7 days, please contact the clinic through [employer] or phone. If you have a critical or abnormal lab result, we will notify you by phone as soon as possible.  Submit refill requests through [employer] or call your pharmacy and they will forward the refill request to us. Please allow 3 business days for your refill to be completed.          Additional Information About Your Visit        MyChart Information     [employer] gives you secure access to your electronic health record. If you see a primary care provider, you can also send messages to your care team and make appointments. If you have questions, please call your primary care clinic.  If you do not have a primary care provider, please call [phone] and they will assist you.        Care EveryWhere ID     This is your Care EveryWhere ID. This could be used by other organizations to access your [facility] medical records  [id number]        Your Vitals Were     Pulse Respirations Height Pulse Oximetry BMI (Body Mass Index)       85 16 1.778 m (5' 10\") 97% 34.58 kg/m2        Blood Pressure from Last 3 Encounters:   04/12/18 134/85   01/11/18 108/70   01/23/17 110/70    Weight from Last 3 Encounters:   04/12/18 109.3 kg (241 lb)   01/11/18 106.1 kg (234 lb)   01/23/17 106.1 kg (234 lb)              We Performed the Following     SLEEP EVALUATION & MANAGEMENT REFERRAL - ADULT -[facility] Susana Horn [phone]  (Age 18 and up)        Primary Care Provider Office Phone # Fax #    " Tray Monroe [phone] [address] [place] [address]        Equal Access to Services     KATIE GR : Hadlopez amita rosenberg nixon Young, wakaitda locestrellitaha, qakanikata kachristianne boyd, kyle salaszaida rick. So [facility] [phone].    ATENCIÓN: Si habla español, tiene a arreola disposición servicios gratuitos de asistencia lingüística. Llame al [phone].    We comply with applicable federal civil rights laws and [place] laws. We do not discriminate on the basis of race, color, national origin, age, disability, sex, sexual orientation, or gender identity.            Thank you!     Thank you for choosing [facility]  for your care. Our goal is always to provide you with excellent care. Hearing back from our patients is one way we can continue to improve our services. Please take a few minutes to complete the written survey that you may receive in the mail after your visit with us. Thank you!             Your Updated Medication List - Protect others around you: Learn how to safely use, store and throw away your medicines at www.disposemymeds.org.          This list is accurate as of 4/12/18  3:14 PM.  Always use your most recent med list.                   Brand Name Dispense Instructions for use Diagnosis    allopurinol 300 MG tablet    ZYLOPRIM    90 tablet    Take 1 tablet (300 mg) by mouth daily Due for labwork    Hyperuricemia       citalopram 10 MG tablet    celeXA    90 tablet    Take 1 tablet (10 mg) by mouth daily    LONNY (generalized anxiety disorder)       mometasone 0.1 % ointment    ELOCON    30 g    Apply sparingly to affected area.    Psoriasis       omeprazole 20 MG CR capsule    priLOSEC    90 capsule    Take 1 capsule (20 mg) by mouth daily    Gastroesophageal reflux disease without esophagitis       SUMAtriptan 50 MG tablet    IMITREX    18 tablet    Take 1 tablet (50 mg) by mouth at onset of headache for migraine May repeat  dose in 2 hours.  Do not exceed 200 mg in 24 hours    Intractable headache, unspecified chronicity pattern, unspecified headache type

## 2018-04-18 ENCOUNTER — OFFICE VISIT (OUTPATIENT)
Dept: SLEEP MEDICINE | Facility: CLINIC | Age: 38
End: 2018-04-18
Attending: PHYSICIAN ASSISTANT
Payer: COMMERCIAL

## 2018-04-18 DIAGNOSIS — R06.83 SNORING: ICD-10-CM

## 2018-04-18 DIAGNOSIS — G47.30 OBSERVED SLEEP APNEA: ICD-10-CM

## 2018-04-18 PROCEDURE — G0399 HOME SLEEP TEST/TYPE 3 PORTA: HCPCS | Performed by: INTERNAL MEDICINE

## 2018-04-18 NOTE — MR AVS SNAPSHOT
After Visit Summary   4/18/2018    Alexis Piña    MRN: [id number]           Patient Information     Date Of Birth          1980        Visit Information        Provider Department      4/18/2018 2:00 PM BED 7 [facility] SLEEP [facility]        Today's Diagnoses     BMI 34.0-34.9,adult        Observed sleep apnea        Snoring           Follow-ups after your visit        Your next 10 appointments already scheduled     Apr 19, 2018 11:15 AM CDT   New Visit with Derek Rowley MD   [facility] ([facility])    [address]  [facility] [facility] [address]   [phone] OPT 2            Apr 23, 2018 11:30 AM CDT   (Arrive by 11:15 AM)   [place] Chiro Initial with JOHNNY Torres (VINI Monique  )    [address]  [address]  [place] [address] [phone]            Apr 26, 2018  3:00 PM CDT   Return Sleep Patient with Bennett Ezra Goltz, PA-C   [facility] ([facility])    [address]  [facility] [place] [facility] [place] [address] [phone]              Who to contact     If you have questions or need follow up information about today's clinic visit or your schedule please contact [facility] directly at [phone].  Normal or non-critical lab and imaging results will be communicated to you by MyChart, letter or phone within 4 business days after the clinic has received the results. If you do not hear from us within 7 days, please contact the clinic through [employer] or phone. If you have a critical or abnormal lab result, we will notify you by phone as soon as possible.  Submit refill requests through [employer] or call your pharmacy and they will forward the refill request to us. Please allow 3 business days for your refill to be completed.          Additional Information About Your Visit        MyChart Information     [employer] gives you  secure access to your electronic health record. If you see a primary care provider, you can also send messages to your care team and make appointments. If you have questions, please call your primary care clinic.  If you do not have a primary care provider, please call [phone] and they will assist you.        Care EveryWhere ID     This is your Care EveryWhere ID. This could be used by other organizations to access your [facility] medical records  [id number]         Blood Pressure from Last 3 Encounters:   04/12/18 134/85   01/11/18 108/70   01/23/17 110/70    Weight from Last 3 Encounters:   04/12/18 109.3 kg (241 lb)   01/11/18 106.1 kg (234 lb)   01/23/17 106.1 kg (234 lb)              We Performed the Following     HST-Home Sleep Apnea Test        Primary Care Provider Office Phone # Fax #    Tray Monroe [phone] [address] [place] [address]        Equal Access to Services     [facility] : Hadii aad ku hadasho Soomaali, waaxda luqadaha, qaybta kaalmada adeegyada, waxay idiin hayaan tye rios . So [facility] [phone].    ATENCIÓN: Si habla español, tiene a arreola disposición servicios gratuitos de asistencia lingüística. [facility] [phone].    We comply with applicable federal civil rights laws and [place] laws. We do not discriminate on the basis of race, color, national origin, age, disability, sex, sexual orientation, or gender identity.            Thank you!     Thank you for choosing [facility]  for your care. Our goal is always to provide you with excellent care. Hearing back from our patients is one way we can continue to improve our services. Please take a few minutes to complete the written survey that you may receive in the mail after your visit with us. Thank you!             Your Updated Medication List - Protect others around you: Learn how to safely use, store and throw away your medicines at www.disposemymeds.org.           This list is accurate as of 4/18/18 11:59 PM.  Always use your most recent med list.                   Brand Name Dispense Instructions for use Diagnosis    allopurinol 300 MG tablet    ZYLOPRIM    90 tablet    Take 1 tablet (300 mg) by mouth daily Due for labwork    Hyperuricemia       citalopram 10 MG tablet    celeXA    90 tablet    Take 1 tablet (10 mg) by mouth daily    LONNY (generalized anxiety disorder)       mometasone 0.1 % ointment    ELOCON    30 g    Apply sparingly to affected area.    Psoriasis       omeprazole 20 MG CR capsule    priLOSEC    90 capsule    Take 1 capsule (20 mg) by mouth daily    Gastroesophageal reflux disease without esophagitis       SUMAtriptan 50 MG tablet    IMITREX    18 tablet    Take 1 tablet (50 mg) by mouth at onset of headache for migraine May repeat dose in 2 hours.  Do not exceed 200 mg in 24 hours    Intractable headache, unspecified chronicity pattern, unspecified headache type

## 2018-04-19 ENCOUNTER — DOCUMENTATION ONLY (OUTPATIENT)
Dept: SLEEP MEDICINE | Facility: CLINIC | Age: 38
End: 2018-04-19
Payer: COMMERCIAL

## 2018-04-19 ENCOUNTER — OFFICE VISIT (OUTPATIENT)
Dept: CARDIOLOGY | Facility: CLINIC | Age: 38
End: 2018-04-19
Attending: INTERNAL MEDICINE
Payer: COMMERCIAL

## 2018-04-19 VITALS
WEIGHT: 241 LBS | SYSTOLIC BLOOD PRESSURE: 129 MMHG | BODY MASS INDEX: 34.5 KG/M2 | HEIGHT: 70 IN | DIASTOLIC BLOOD PRESSURE: 88 MMHG | HEART RATE: 73 BPM

## 2018-04-19 DIAGNOSIS — R00.2 PALPITATIONS: ICD-10-CM

## 2018-04-19 DIAGNOSIS — R00.1 BRADYCARDIA: Primary | ICD-10-CM

## 2018-04-19 DIAGNOSIS — R07.89 ATYPICAL CHEST PAIN: ICD-10-CM

## 2018-04-19 PROCEDURE — 99204 OFFICE O/P NEW MOD 45 MIN: CPT | Mod: 25 | Performed by: INTERNAL MEDICINE

## 2018-04-19 PROCEDURE — 93000 ELECTROCARDIOGRAM COMPLETE: CPT | Performed by: INTERNAL MEDICINE

## 2018-04-19 NOTE — PROGRESS NOTES
Patient instructed on HST use. Patient demonstrated and verbalized knowledge of use. Device programmed to start at 12:15am. Device will be returned tomorrow before noon.    Pennie DurbinBarcenas  Sleep Clinic - Specialist

## 2018-04-19 NOTE — LETTER
4/19/2018      Tray Monroe MD, MD  [address] [place] [address]      RE: Alexis Piña       Dear Colleague,    I had the pleasure of seeing Alexis Piña in the [facility].    Service Date: 04/19/2018      REASON FOR CARDIOLOGY VISIT:  Palpitations, abnormal heart rate response to exercise.      HISTORY OF PRESENT ILLNESS:  Mr. Piña is a very pleasant 37-year-old gentleman with history of LONNY, obesity, prediabetes who is here for evaluation of above-mentioned complaints.  The patient tells me that he is fairly active.  He has run marathons in the past.  He also regularly does mountain biking covering about 30-40 miles.  He also does exercise workouts.  Of late, the patient has noticed that his heart rate would go up to 171-180 beats per minute which is kind of appropriate response to exercise for him, but then suddenly it would drop and he would also feel that his heart rate is low.  He wears a heart rate monitor and also showed me some heart rates on his phone and there is an abrupt drop from 170 to 90; however, the patient also feels that the data may have been corrected because he was exercising intensely and there may also be some contribution from sweating also.      Anyway, patient also feels a pounding sensation in his chest, not fast, but heart is pounding in the chest.  No norberto discomfort but it feels uncomfortable.  No dyspnea at rest or with exertion.  No dizziness, presyncope or syncope.        His father had stents placed when he was in early 60s.  He had a younger sister who apparently does not have any known heart disease.        The patient does have some dyslipidemia which seems to be mixed dyslipidemia with triglycerides elevated at 462, HDL low at 35.  LDL marginally elevated at 108.        He does not have any hypertension or tobacco exposure.  He acknowledges moderate consumption of alcohol.      PHYSICAL EXAMINATION:   VITAL SIGNS:  Blood pressure 129/88,  heart rate 73 and regular, weight 241 pounds, BMI 34.64.   GENERAL:  The patient appears pleasant, comfortable.   NECK:  Normal JVP, no bruit.   CARDIOVASCULAR SYSTEM:  There is an ejection systolic click heard over the right upper sternal border, no S3, S4, rub or gallop.   RESPIRATORY SYSTEM:  Clear to auscultation bilaterally.   ABDOMEN:  Soft, nontender.   EXTREMITIES:  No pitting pedal edema.   NEUROLOGICAL:  Alert and oriented.   PSYCHIATRIC:  Normal affect.   SKIN:  No obvious rash.   HEENT:  No pallor or icterus.        EKG as noted above shows sinus bradycardia with normal intervals.  Questionable septal Q-waves.      IMPRESSION AND PLAN:  A very pleasant 37-year-old gentleman who has a fairly reasonable exercise capacity on a regular basis as noted above.  Of late, has noted some abnormal heart rate response.  Fortunately no dizziness, presyncope or syncope.  He also had some pounding sensation in the chest, not norberto chest discomfort, but a little bit uncomfortable.        I had a long discussion with the patient regarding his symptoms.  This may indicate an arrhythmia, although I agree with the patient's assessment that the heart rate data may also be corrupted at peak exercise due to motion as well as sweating artifact.        In terms of CAD risk, he has a family history of coronary artery disease, borderline premature for that as well as has some mixed dyslipidemia.  There is borderline questionable septal Q-waves on EKG.  On cardiac auscultation, I hear an ejection systolic click that may be normal versus possible bicuspid aortic valve.        After a long discussion, we decided to proceed with exercise stress echocardiogram to simulate his exercise condition.  This will give us an idea about his heart rate response, to  any arrhythmia and it will also help evaluate heart function and valvular assessment and to look for any inducible ischemia.  My clinical suspicion for latter is low.         My office is going to update him with the results of the stress test and further recommendation will depend upon the results.         DEREK ROWLEY MD             D: 2018   T: 2018   MT: NICOLE      Name:     NATHAN ÁLVAREZ   MRN:      [id number]        Account:      [id number]   :      1980           Service Date: 2018      Document: [id number]         Outpatient Encounter Prescriptions as of 2018   Medication Sig Dispense Refill     allopurinol (ZYLOPRIM) 300 MG tablet Take 1 tablet (300 mg) by mouth daily Due for labwork 90 tablet 1     citalopram (CELEXA) 10 MG tablet Take 1 tablet (10 mg) by mouth daily 90 tablet 3     mometasone (ELOCON) 0.1 % ointment Apply sparingly to affected area. 30 g 0     omeprazole (PRILOSEC) 20 MG CR capsule Take 1 capsule (20 mg) by mouth daily 90 capsule 3     SUMAtriptan (IMITREX) 50 MG tablet Take 1 tablet (50 mg) by mouth at onset of headache for migraine May repeat dose in 2 hours.  Do not exceed 200 mg in 24 hours 18 tablet 3     No facility-administered encounter medications on file as of 2018.        Again, thank you for allowing me to participate in the care of your patient.      Sincerely,    Derek Rowley MD     [facility]

## 2018-04-19 NOTE — PROGRESS NOTES
Service Date: 04/19/2018      REASON FOR CARDIOLOGY VISIT:  Palpitations, abnormal heart rate response to exercise.      HISTORY OF PRESENT ILLNESS:  Mr. Piña is a very pleasant 37-year-old gentleman with history of CAD, obesity, prediabetes who is here for evaluation of above-mentioned complaints.  The patient tells me that he is fairly active.  He has run marathons in the past.  He also regularly does mountain biking covering about 30-40 miles.  He also does exercise workouts.  Of late, the patient has noticed that his heart rate would go up to 171-180 beats per minute which is kind of appropriate response to exercise for him, but then suddenly it would drop and he would also feel that his heart rate is low.  He wears a heart rate monitor and also showed me some heart rates on his phone and there is an abrupt drop from 170 to 90; however, the patient also feels that the data may have been corrected because he was exercising intensely and there may also be some contribution from sweating also.      Anyway, patient also feels a pounding sensation in his chest, not fast, but heart is pounding in the chest.  No norberto discomfort but it feels uncomfortable.  No dyspnea at rest or with exertion.  No dizziness, presyncope or syncope.        His father had stents placed when he was in early 60s.  He had a younger sister who apparently does not have any known heart disease.        The patient does have some dyslipidemia which seems to be mixed dyslipidemia with triglycerides elevated at 462, HDL low at 35.  LDL marginally elevated at 108.        He does not have any hypertension or tobacco exposure.  He acknowledges moderate consumption of alcohol.      PHYSICAL EXAMINATION:   VITAL SIGNS:  Blood pressure 129/88, heart rate 73 and regular, weight 241 pounds, BMI 34.64.   GENERAL:  The patient appears pleasant, comfortable.   NECK:  Normal JVP, no bruit.   CARDIOVASCULAR SYSTEM:  There is an ejection systolic click heard  over the right upper sternal border, no S3, S4, rub or gallop.   RESPIRATORY SYSTEM:  Clear to auscultation bilaterally.   ABDOMEN:  Soft, nontender.   EXTREMITIES:  No pitting pedal edema.   NEUROLOGICAL:  Alert and oriented.   PSYCHIATRIC:  Normal affect.   SKIN:  No obvious rash.   HEENT:  No pallor or icterus.        EKG as noted above shows sinus bradycardia with normal intervals.  Questionable septal Q-waves.      IMPRESSION AND PLAN:  A very pleasant 37-year-old gentleman who has a fairly reasonable exercise capacity on a regular basis as noted above.  Of late, has noted some abnormal heart rate response.  Fortunately no dizziness, presyncope or syncope.  He also had some pounding sensation in the chest, not norberto chest discomfort, but a little bit uncomfortable.        I had a long discussion with the patient regarding his symptoms.  This may indicate an arrhythmia, although I agree with the patient's assessment that the heart rate data may also be corrupted at peak exercise due to motion as well as sweating artifact.        In terms of CAD risk, he has a family history of coronary artery disease, borderline premature for that as well as has some mixed dyslipidemia.  There is borderline questionable septal Q-waves on EKG.  On cardiac auscultation, I hear an ejection systolic click that may be normal versus possible bicuspid aortic valve.        After a long discussion, we decided to proceed with exercise stress echocardiogram to simulate his exercise condition.  This will give us an idea about his heart rate response, to  any arrhythmia and it will also help evaluate heart function and valvular assessment and to look for any inducible ischemia.  My clinical suspicion for latter is low.        My office is going to update him with the results of the stress test and further recommendation will depend upon the results.         JG FERRER MD             D: 04/19/2018   T: 04/19/2018   MT: NICOLE       Name:     NATHAN ÁLVAREZ   MRN:      [id number]        Account:      [id number]   :      1980           Service Date: 2018      Document: [id number]

## 2018-04-19 NOTE — NURSING NOTE
HST drop off  Download successful. Routed for scoring.      Pennie DurbinBarcenas  Sleep Clinic - Specialist

## 2018-04-19 NOTE — MR AVS SNAPSHOT
After Visit Summary   4/19/2018    Alexis Piña    MRN: [id number]           Patient Information     Date Of Birth          1980        Visit Information        Provider Department      4/19/2018 11:15 AM Derek Rowley MD [facility]        Today's Diagnoses     Bradycardia    -  1    Atypical chest pain        Palpitations           Follow-ups after your visit        Your next 10 appointments already scheduled     Apr 23, 2018 11:30 AM CDT   (Arrive by 11:15 AM)   MyChart Chiro Initial with JOHNNY Torres (VINI Monique  )    [address]  [facility] [id number] [phone]            Apr 26, 2018  3:00 PM CDT   Return Sleep Patient with Bennett Ezra Goltz, PA-C   [facility] ([facility])    [address]  [facility] [id number] [phone]              Future tests that were ordered for you today     Open Future Orders        Priority Expected Expires Ordered    Exercise Stress Echocardiogram Routine 4/26/2018 4/19/2019 4/19/2018            Who to contact     If you have questions or need follow up information about today's clinic visit or your schedule please contact [facility] directly at [phone].  Normal or non-critical lab and imaging results will be communicated to you by MyChart, letter or phone within 4 business days after the clinic has received the results. If you do not hear from us within 7 days, please contact the clinic through MyChart or phone. If you have a critical or abnormal lab result, we will notify you by phone as soon as possible.  Submit refill requests through [employer] or call your pharmacy and they will forward the refill request to us. Please allow 3 business days for your refill to be completed.          Additional Information About Your Visit        MyChart Information      "[employer] gives you secure access to your electronic health record. If you see a primary care provider, you can also send messages to your care team and make appointments. If you have questions, please call your primary care clinic.  If you do not have a primary care provider, please call [phone] and they will assist you.        Care EveryWhere ID     This is your Care EveryWhere ID. This could be used by other organizations to access your [place] medical records  [id number]        Your Vitals Were     Pulse Height BMI (Body Mass Index)             73 1.778 m (5' 10\") 34.58 kg/m2          Blood Pressure from Last 3 Encounters:   04/19/18 129/88   04/12/18 134/85   01/11/18 108/70    Weight from Last 3 Encounters:   04/19/18 109.3 kg (241 lb)   04/12/18 109.3 kg (241 lb)   01/11/18 106.1 kg (234 lb)              We Performed the Following     EKG 12-lead complete w/read - Clinics (performed today)        Primary Care Provider Office Phone # Fax #    Tray Monroe [phone] [address] [place] [address]        Equal Access to Services     [facility] : Hadii aad ku hadasho Sojensali, waaxda luqadaha, qaybta kaalmada adeegyada, kyle cabrera. So [facility] [phone].    ATENCIÓN: Si habla español, tiene a arreola disposición servicios gratuitos de asistencia lingüística. [facility] [phone].    We comply with applicable federal civil rights laws and [place] laws. We do not discriminate on the basis of race, color, national origin, age, disability, sex, sexual orientation, or gender identity.            Thank you!     Thank you for choosing [facility]  for your care. Our goal is always to provide you with excellent care. Hearing back from our patients is one way we can continue to improve our services. Please take a few minutes to complete the written survey that you may receive in the mail after your visit " with us. Thank you!             Your Updated Medication List - Protect others around you: Learn how to safely use, store and throw away your medicines at www.disposemymeds.org.          This list is accurate as of 4/19/18 11:43 AM.  Always use your most recent med list.                   Brand Name Dispense Instructions for use Diagnosis    allopurinol 300 MG tablet    ZYLOPRIM    90 tablet    Take 1 tablet (300 mg) by mouth daily Due for labwork    Hyperuricemia       citalopram 10 MG tablet    celeXA    90 tablet    Take 1 tablet (10 mg) by mouth daily    LONNY (generalized anxiety disorder)       mometasone 0.1 % ointment    ELOCON    30 g    Apply sparingly to affected area.    Psoriasis       omeprazole 20 MG CR capsule    priLOSEC    90 capsule    Take 1 capsule (20 mg) by mouth daily    Gastroesophageal reflux disease without esophagitis       SUMAtriptan 50 MG tablet    IMITREX    18 tablet    Take 1 tablet (50 mg) by mouth at onset of headache for migraine May repeat dose in 2 hours.  Do not exceed 200 mg in 24 hours    Intractable headache, unspecified chronicity pattern, unspecified headache type

## 2018-04-19 NOTE — LETTER
4/19/2018    Tray Monroe MD, MD  [address] [place] [address]    RE: Alexis Piña       Dear Colleague,    I had the pleasure of seeing Alexis Piña in the [facility].    HPI and Plan:   See dictation(#[id number])    Orders Placed This Encounter   Procedures     EKG 12-lead complete w/read - Clinics (performed today)     Exercise Stress Echocardiogram       No orders of the defined types were placed in this encounter.      There are no discontinued medications.      Encounter Diagnoses   Name Primary?     Bradycardia Yes     Atypical chest pain      Palpitations        CURRENT MEDICATIONS:  Current Outpatient Prescriptions   Medication Sig Dispense Refill     allopurinol (ZYLOPRIM) 300 MG tablet Take 1 tablet (300 mg) by mouth daily Due for labwork 90 tablet 1     citalopram (CELEXA) 10 MG tablet Take 1 tablet (10 mg) by mouth daily 90 tablet 3     mometasone (ELOCON) 0.1 % ointment Apply sparingly to affected area. 30 g 0     omeprazole (PRILOSEC) 20 MG CR capsule Take 1 capsule (20 mg) by mouth daily 90 capsule 3     SUMAtriptan (IMITREX) 50 MG tablet Take 1 tablet (50 mg) by mouth at onset of headache for migraine May repeat dose in 2 hours.  Do not exceed 200 mg in 24 hours 18 tablet 3       ALLERGIES     Allergies   Allergen Reactions     Amoxicillin [Penicillins] Hives     Doxy [Doxycycline Hyclate] Hives       PAST MEDICAL HISTORY:  Past Medical History:   Diagnosis Date     Depressive disorder      Pure hypercholesterolemia      Seborrhea      Tobacco use disorder      Vitamin D deficiency 12/22/2011       PAST SURGICAL HISTORY:  Past Surgical History:   Procedure Laterality Date     NO HISTORY OF SURGERY         FAMILY HISTORY:  Family History   Problem Relation Age of Onset     Neurologic Disorder Mother      MS     DIABETES Paternal Grandmother      CANCER Father      father diagnose with bladder cancer at age 61 yrs old     Coronary Artery Disease Father      C.A.D. No  "family hx of      Hypertension No family hx of      Cancer - colorectal No family hx of      Prostate Cancer No family hx of        SOCIAL HISTORY:  Social History     Social History     Marital status:      Spouse name: N/A     Number of children: N/A     Years of education: N/A     Social History Main Topics     Smoking status: Former Smoker     Smokeless tobacco: Never Used      Comment: quit 12/03/2005     Alcohol use 20.0 oz/week      Comment: 3 times per week, 3 drinks     Drug use: No     Sexual activity: Yes     Partners: Female     Other Topics Concern     Parent/Sibling W/ Cabg, Mi Or Angioplasty Before 65f 55m? No     Social History Narrative       Review of Systems:  Skin:  Negative       Eyes:  Positive for glasses    ENT:  Negative      Respiratory:  Negative       Cardiovascular:  Negative palpitations;Positive for irregular  Gastroenterology: Negative      Genitourinary:  Negative      Musculoskeletal:  Positive for back pain    Neurologic:  Negative      Psychiatric:  Positive for depression;anxiety    Heme/Lymph/Imm:  Negative      Endocrine:  Negative        Physical Exam:  Vitals: /88  Pulse 73  Ht 1.778 m (5' 10\")  Wt 109.3 kg (241 lb)  BMI 34.58 kg/m2    Constitutional:           Skin:             Head:           Eyes:           Lymph:      ENT:           Neck:           Respiratory:            Cardiac:                                                           GI:           Extremities and Muscular Skeletal:                 Neurological:           Psych:             CC  Tray Monroe MD  [address] [place] [address] [place] [address]                    Thank you for allowing me to participate in the care of your patient.      Sincerely,     Derek Rowley MD     [facility]    cc:   Tray Monroe MD  [address] [place] [address] [place] [address]        "

## 2018-04-19 NOTE — PROGRESS NOTES
HPI and Plan:   See dictation(#[id number])    Orders Placed This Encounter   Procedures     EKG 12-lead complete w/read - Clinics (performed today)     Exercise Stress Echocardiogram       No orders of the defined types were placed in this encounter.      There are no discontinued medications.      Encounter Diagnoses   Name Primary?     Bradycardia Yes     Atypical chest pain      Palpitations        CURRENT MEDICATIONS:  Current Outpatient Prescriptions   Medication Sig Dispense Refill     allopurinol (ZYLOPRIM) 300 MG tablet Take 1 tablet (300 mg) by mouth daily Due for labwork 90 tablet 1     citalopram (CELEXA) 10 MG tablet Take 1 tablet (10 mg) by mouth daily 90 tablet 3     mometasone (ELOCON) 0.1 % ointment Apply sparingly to affected area. 30 g 0     omeprazole (PRILOSEC) 20 MG CR capsule Take 1 capsule (20 mg) by mouth daily 90 capsule 3     SUMAtriptan (IMITREX) 50 MG tablet Take 1 tablet (50 mg) by mouth at onset of headache for migraine May repeat dose in 2 hours.  Do not exceed 200 mg in 24 hours 18 tablet 3       ALLERGIES     Allergies   Allergen Reactions     Amoxicillin [Penicillins] Hives     Doxy [Doxycycline Hyclate] Hives       PAST MEDICAL HISTORY:  Past Medical History:   Diagnosis Date     Depressive disorder      Pure hypercholesterolemia      Seborrhea      Tobacco use disorder      Vitamin D deficiency 12/22/2011       PAST SURGICAL HISTORY:  Past Surgical History:   Procedure Laterality Date     NO HISTORY OF SURGERY         FAMILY HISTORY:  Family History   Problem Relation Age of Onset     Neurologic Disorder Mother      MS     DIABETES Paternal Grandmother      CANCER Father      father diagnose with bladder cancer at age 61 yrs old     Coronary Artery Disease Father      C.A.D. No family hx of      Hypertension No family hx of      Cancer - colorectal No family hx of      Prostate Cancer No family hx of        SOCIAL HISTORY:  Social History     Social History     Marital status:   "    Spouse name: N/A     Number of children: N/A     Years of education: N/A     Social History Main Topics     Smoking status: Former Smoker     Smokeless tobacco: Never Used      Comment: quit 12/03/2005     Alcohol use 20.0 oz/week      Comment: 3 times per week, 3 drinks     Drug use: No     Sexual activity: Yes     Partners: Female     Other Topics Concern     Parent/Sibling W/ Cabg, Mi Or Angioplasty Before 65f 55m? No     Social History Narrative       Review of Systems:  Skin:  Negative       Eyes:  Positive for glasses    ENT:  Negative      Respiratory:  Negative       Cardiovascular:  Negative palpitations;Positive for irregular  Gastroenterology: Negative      Genitourinary:  Negative      Musculoskeletal:  Positive for back pain    Neurologic:  Negative      Psychiatric:  Positive for depression;anxiety    Heme/Lymph/Imm:  Negative      Endocrine:  Negative        Physical Exam:  Vitals: /88  Pulse 73  Ht 1.778 m (5' 10\")  Wt 109.3 kg (241 lb)  BMI 34.58 kg/m2    Constitutional:           Skin:             Head:           Eyes:           Lymph:      ENT:           Neck:           Respiratory:            Cardiac:                                                           GI:           Extremities and Muscular Skeletal:                 Neurological:           Psych:             CC  Tray Monroe MD  [address] [place] [address]                  "

## 2018-04-20 NOTE — PROCEDURES
"HOME SLEEP STUDY INTERPRETATION    Patient: Alexis Piña  MRN: [id number]  YOB: 1980  Study Date: 2018  Referring Provider: Tray Monroe;   Ordering Provider: Bennett Goltz, PA     Indications for Home Study: Alexis Piña is a 37 year old male with a history of obesity who presents with symptoms suggestive of obstructive sleep apnea.    Estimated body mass index is 34.58 kg/(m^2) as calculated from the following:    Height as of 18: 1.778 m (5' 10\").    Weight as of 18: 109.3 kg (241 lb).  Total score - [place]: 5 (2018  2:19 PM)  STOP-BAN/8    Data: A full night home sleep study was performed recording the standard physiologic parameters including body position, movement, sound, nasal pressure, thermal oral airflow, chest and abdominal movements with respiratory inductance plethysmography, and oxygen saturation by pulse oximetry. Pulse rate was estimated by oximetry recording. This study was considered adequate based on > 4 hours of quality oximetry and respiratory recording. As specified by the AASM Manual for the Scoring of Sleep and Associated events, version 2.3, Rule VIII.D 1B, 4% oxygen desaturation scoring for hypopneas is used as a standard of care on all home sleep apnea testing.    Analysis Time:  435.7 minutes    Respiration:   Sleep Associated Hypoxemia: sustained hypoxemia was not present. Baseline oxygen saturation was 93%.  Time with saturation less than or equal to 88% was 0.4 minutes. The lowest oxygen saturation was 81%.   Snoring: Snoring was present.  Respiratory events: The home study revealed a presence of 3 obstructive apneas and 0 mixed and 2 central apneas. There were 7 hypopneas resulting in a combined apnea/hypopnea index [AHI] of 1.7 events per hour.  AHI was 2.5 per hour supine, - per hour prone, 2.8 per hour on left side, and 0.3 per hour on right side.   Pattern: Excluding events noted above, respiratory rate and pattern was Normal.    Position: " Percent of time spent: supine - 27.5%, prone - 0%, on left - 25%, on right - 44%.    Heart Rate: By pulse oximetry normal rate was noted.     Assessment:   Negative for sleep apnea.  Sleep associated hypoxemia was not present.    Recommendations:    Weight management.    Diagnosis Code(s): Snoring R06.83    Elliot Bourne MD, MD, April 20, 2018   Diplomate, American Board of Psychiatry and Neurology, Sleep Medicine

## 2018-04-23 ENCOUNTER — THERAPY VISIT (OUTPATIENT)
Dept: CHIROPRACTIC MEDICINE | Facility: CLINIC | Age: 38
End: 2018-04-23
Payer: COMMERCIAL

## 2018-04-23 DIAGNOSIS — M99.03 SOMATIC DYSFUNCTION OF LUMBAR REGION: ICD-10-CM

## 2018-04-23 DIAGNOSIS — M99.05 SOMATIC DYSFUNCTION OF PELVIS REGION: ICD-10-CM

## 2018-04-23 DIAGNOSIS — M99.02 THORACIC SEGMENT DYSFUNCTION: ICD-10-CM

## 2018-04-23 DIAGNOSIS — M54.50 ACUTE BILATERAL LOW BACK PAIN WITHOUT SCIATICA: Primary | ICD-10-CM

## 2018-04-23 PROCEDURE — 98941 CHIROPRACT MANJ 3-4 REGIONS: CPT | Mod: AT | Performed by: CHIROPRACTOR

## 2018-04-23 PROCEDURE — 97035 APP MDLTY 1+ULTRASOUND EA 15: CPT | Performed by: CHIROPRACTOR

## 2018-04-23 PROCEDURE — 99213 OFFICE O/P EST LOW 20 MIN: CPT | Mod: 25 | Performed by: CHIROPRACTOR

## 2018-04-23 NOTE — PROGRESS NOTES
Subjective:    Alexis Piña is a 37 year old male with a lumbar condition.  Condition occurred with:  Insidious onset.    This is a new and chronic condition  Patient presents today with lower back pain that started 2-3 weeks ago after sitting poorly on a couch.  States he doesn't remember any specific incident that caused the LBP. Patient reports having one to two episodes a year of LBP for the last five years. Patient denies radicular LE pain or weakness.  Patient reports he does have stiffness and achy pain through his mid-back, mostly related to sitting, computer work.   .    Patient reports pain:  Lower lumbar spine, /sacrum, mid thoracic spine.    Pain is described as aching and is intermittent and reported as 6/10.  Associated symptoms:  Loss of motion/stiffness. Pain is worse during the day.  Symptoms are exacerbated by twisting, carrying, lifting and standing and relieved by rest.  Since onset symptoms are unchanged        General health as reported by patient is excellent.    Medical allergies: yes (PCN).  Other surgeries include:  No.  Current medications:  None as reported by the patient.  Current occupation is .  Patient is working in normal job without restrictions.  Primary job tasks include:  Prolonged sitting and repetitive tasks.    Barriers include:  None as reported by the patient.    Red flags:  None as reported by the patient.                      Objective:    Standing Alignment:    Cervical/Thoracic:  Forward head    Lumbar:  Lordosis decr            Gait:    Gait Type:  Normal                    Lumbar/SI Evaluation  ROM:  AROM Lumbar: normal      Lumbar Myotomes:  normal            Lumbar DTR's:  normal        Lumbar Dermtomes:  normal                  Lumbar Palpation:    Tenderness present at Left:    Quadratus Lumborum; Erector Spinae and Hamstrings  Tenderness present at Right: Quadratus Lumborum; Erector Spinae and Iliac Crest  Functional Tests:  Core strength and proprioception  lumbar: Kemps-Neg, Milgrams-Neg, SLR left and right-Neg, tight hamstrings, Fabere's left and right Neg.          Spinal Segmental Conclusions: T4-T5 right rib head palpated elevated and tender.    Level: Hypo noted at S1 and L5                                                                                        Musculoskeletal:        Back:         Review of Systems   Constitutional: Negative.    Musculoskeletal: Positive for back pain.   Skin: Negative.    Neurological: Negative.    Psychiatric/Behavioral: Negative.        Assessment/Plan:      Diagnosis (See diagnoses listed above in encounter notes.)    Treatment Plan    Evaluation  Spinal Chiropractic Manipulative Therapy:  T4-5-right, L4, L5-side posture-right side up only, SIJ-drop piece  Ultrasound to L/S spine for 8min at 2.0    Post treatment response:  Patient tolerated the treatment well today.    Rehab Potential / Prognosis:  good    Rx Explanation - The following has been discussed with the patient:  RX ordered/plan of care  Anticipated outcomes  Possible risks and side effects  Frequency:  1 X week  Duration:  for 2-3 weeks. Total of 2-4 visits over the next 2-3 weeks.    Specific and Measurable Treatment Goals: able to bike with out back pain 2-3 weeks. Able to sit 60 minutes with out LBP in 2-3 weeks.      Please refer to the daily flowsheet for treatment today, total treatment time and time spent performing 1:1 timed codes.

## 2018-04-24 ENCOUNTER — TELEPHONE (OUTPATIENT)
Dept: LAB | Facility: CLINIC | Age: 38
End: 2018-04-24

## 2018-04-24 ENCOUNTER — HOSPITAL ENCOUNTER (OUTPATIENT)
Dept: CARDIOLOGY | Facility: CLINIC | Age: 38
Discharge: HOME | End: 2018-04-24
Attending: INTERNAL MEDICINE | Admitting: INTERNAL MEDICINE
Payer: COMMERCIAL

## 2018-04-24 DIAGNOSIS — R07.89 ATYPICAL CHEST PAIN: ICD-10-CM

## 2018-04-24 DIAGNOSIS — R00.2 PALPITATIONS: ICD-10-CM

## 2018-04-24 PROCEDURE — 93018 CV STRESS TEST I&R ONLY: CPT | Performed by: INTERNAL MEDICINE

## 2018-04-24 PROCEDURE — 93350 STRESS TTE ONLY: CPT | Mod: 26 | Performed by: INTERNAL MEDICINE

## 2018-04-24 PROCEDURE — 93350 STRESS TTE ONLY: CPT | Mod: TC

## 2018-04-24 PROCEDURE — 93325 DOPPLER ECHO COLOR FLOW MAPG: CPT | Mod: 26 | Performed by: INTERNAL MEDICINE

## 2018-04-24 PROCEDURE — 93321 DOPPLER ECHO F-UP/LMTD STD: CPT | Mod: 26 | Performed by: INTERNAL MEDICINE

## 2018-04-24 PROCEDURE — 93016 CV STRESS TEST SUPVJ ONLY: CPT | Performed by: INTERNAL MEDICINE

## 2018-04-24 PROCEDURE — 25500064 ZZH RX 255 OP 636: Performed by: INTERNAL MEDICINE

## 2018-04-24 RX ADMIN — HUMAN ALBUMIN MICROSPHERES AND PERFLUTREN 6 ML: 10; .22 INJECTION, SOLUTION INTRAVENOUS at 14:30

## 2018-04-24 NOTE — TELEPHONE ENCOUNTER
Stress echo  Interpretation Summary  Normal resting heart rate and blood pressure with normal response to exercise  Normal exercise capacity for age  Normal resting ECG with no ischemic changes seen post exercise.  Normal resting LV function with good augmentation of all wall segments post  exercise.  This is a normal stress echo indicating low probability of significant  exercise-induced myocardial ischemia.    4- OV  After a long discussion, we decided to proceed with exercise stress echocardiogram to simulate his exercise condition.  This will give us an idea about his heart rate response, to  any arrhythmia and it will also help evaluate heart function and valvular assessment and to look for any inducible ischemia.  My clinical suspicion for latter is low.

## 2018-04-26 ENCOUNTER — OFFICE VISIT (OUTPATIENT)
Dept: SLEEP MEDICINE | Facility: CLINIC | Age: 38
End: 2018-04-26
Payer: COMMERCIAL

## 2018-04-26 VITALS
OXYGEN SATURATION: 97 % | HEART RATE: 90 BPM | RESPIRATION RATE: 16 BRPM | WEIGHT: 241.2 LBS | BODY MASS INDEX: 34.53 KG/M2 | DIASTOLIC BLOOD PRESSURE: 88 MMHG | SYSTOLIC BLOOD PRESSURE: 131 MMHG | HEIGHT: 70 IN

## 2018-04-26 DIAGNOSIS — R06.83 SNORING: Primary | ICD-10-CM

## 2018-04-26 PROCEDURE — 99213 OFFICE O/P EST LOW 20 MIN: CPT | Performed by: PHYSICIAN ASSISTANT

## 2018-04-26 NOTE — PATIENT INSTRUCTIONS

## 2018-04-26 NOTE — MR AVS SNAPSHOT
After Visit Summary   4/26/2018    Alexis Piña    MRN: [id number]           Patient Information     Date Of Birth          1980        Visit Information        Provider Department      4/26/2018 3:00 PM Goltz, Bennett Ezra, PA-C [facility]        Today's Diagnoses     Snoring    -  1      Care Instructions      Your BMI is Body mass index is 34.61 kg/(m^2).  Weight management is a personal decision.  If you are interested in exploring weight loss strategies, the following discussion covers the approaches that may be successful. Body mass index (BMI) is one way to tell whether you are at a healthy weight, overweight, or obese. It measures your weight in relation to your height.  A BMI of 18.5 to 24.9 is in the healthy range. A person with a BMI of 25 to 29.9 is considered overweight, and someone with a BMI of 30 or greater is considered obese. More than two-thirds of American adults are considered overweight or obese.  Being overweight or obese increases the risk for further weight gain. Excess weight may lead to heart disease and diabetes.  Creating and following plans for healthy eating and physical activity may help you improve your health.  Weight control is part of healthy lifestyle and includes exercise, emotional health, and healthy eating habits. Careful eating habits lifelong are the mainstay of weight control. Though there are significant health benefits from weight loss, long-term weight loss with diet alone may be very difficult to achieve- studies show long-term success with dietary management in less than 10% of people. Attaining a healthy weight may be especially difficult to achieve in those with severe obesity. In some cases, medications, devices and surgical management might be considered.  What can you do?  If you are overweight or obese and are interested in methods for weight loss, you should discuss this with your provider.     Consider reducing daily calorie  intake by 500 calories.     Keep a food journal.     Avoiding skipping meals, consider cutting portions instead.    Diet combined with exercise helps maintain muscle while optimizing fat loss. Strength training is particularly important for building and maintaining muscle mass. Exercise helps reduce stress, increase energy, and improves fitness. Increasing exercise without diet control, however, may not burn enough calories to loose weight.       Start walking three days a week 10-20 minutes at a time    Work towards walking thirty minutes five days a week     Eventually, increase the speed of your walking for 1-2 minutes at time    In addition, we recommend that you review healthy lifestyles and methods for weight loss available through the National Institutes of Health patient information sites:  http://win.niddk.nih.gov/publications/index.htm    And look into health and wellness programs that may be available through your health insurance provider, employer, local community center, or andrew club.    Weight management plan: Patient was referred to their PCP to discuss a diet and exercise plan.            Follow-ups after your visit        Follow-up notes from your care team     Return in about 6 months (around 10/26/2018).      Who to contact     If you have questions or need follow up information about today's clinic visit or your schedule please contact [facility] directly at [phone].  Normal or non-critical lab and imaging results will be communicated to you by MyChart, letter or phone within 4 business days after the clinic has received the results. If you do not hear from us within 7 days, please contact the clinic through MyChart or phone. If you have a critical or abnormal lab result, we will notify you by phone as soon as possible.  Submit refill requests through [employer] or call your pharmacy and they will forward the refill request to us. Please allow 3 business days for your refill  "to be completed.          Additional Information About Your Visit        [employer] Information     [employer] gives you secure access to your electronic health record. If you see a primary care provider, you can also send messages to your care team and make appointments. If you have questions, please call your primary care clinic.  If you do not have a primary care provider, please call [phone] and they will assist you.        Care EveryWhere ID     This is your Care EveryWhere ID. This could be used by other organizations to access your [facility] medical records  [id number]        Your Vitals Were     Pulse Respirations Height Pulse Oximetry BMI (Body Mass Index)       90 16 1.778 m (5' 10\") 97% 34.61 kg/m2        Blood Pressure from Last 3 Encounters:   04/26/18 131/88   04/19/18 129/88   04/12/18 134/85    Weight from Last 3 Encounters:   04/26/18 109.4 kg (241 lb 3.2 oz)   04/19/18 109.3 kg (241 lb)   04/12/18 109.3 kg (241 lb)              Today, you had the following     No orders found for display       Primary Care Provider Office Phone # Fax #    Tray Monroe [phone] [address] [place] [address]        Equal Access to Services     MAURICIO GR : Hadii aad ku hadasho Soomaali, waaxda luqadaha, qaybta kaalmada adeegyada, waxay idiin hayjosén tye pizarro latony ah. So [facility] [phone].    ATENCIÓN: Si habla español, tiene a arreola disposición servicios gratuitos de asistencia lingüística. Lldeborah al [phone].    We comply with applicable federal civil rights laws and [place] laws. We do not discriminate on the basis of race, color, national origin, age, disability, sex, sexual orientation, or gender identity.            Thank you!     Thank you for choosing [facility]  for your care. Our goal is always to provide you with excellent care. Hearing back from our patients is one way we can continue to improve our services. Please take a few " minutes to complete the written survey that you may receive in the mail after your visit with us. Thank you!             Your Updated Medication List - Protect others around you: Learn how to safely use, store and throw away your medicines at www.disposemymeds.org.          This list is accurate as of 4/26/18 11:59 PM.  Always use your most recent med list.                   Brand Name Dispense Instructions for use Diagnosis    allopurinol 300 MG tablet    ZYLOPRIM    90 tablet    Take 1 tablet (300 mg) by mouth daily Due for labwork    Hyperuricemia       citalopram 10 MG tablet    celeXA    90 tablet    Take 1 tablet (10 mg) by mouth daily    LONNY (generalized anxiety disorder)       mometasone 0.1 % ointment    ELOCON    30 g    Apply sparingly to affected area.    Psoriasis       omeprazole 20 MG CR capsule    priLOSEC    90 capsule    Take 1 capsule (20 mg) by mouth daily    Gastroesophageal reflux disease without esophagitis       SUMAtriptan 50 MG tablet    IMITREX    18 tablet    Take 1 tablet (50 mg) by mouth at onset of headache for migraine May repeat dose in 2 hours.  Do not exceed 200 mg in 24 hours    Intractable headache, unspecified chronicity pattern, unspecified headache type

## 2018-04-26 NOTE — PROGRESS NOTES
Sleep Study Follow-Up Visit:    Date on this visit: 4/26/2018    Alexis Piña comes in today for follow-up of his sleep study done on 4/18/2018 at the [facility] for excessive snoring for most of his adult life and observed apnea for years. His medical history is significant for obesity (BMI 33), LONNY, prediabetes.    Home Sleep Study Test Results    Bed Time Starts: 12:15 AM.  Bed Time Ends: 7:30 AM.  Total estimated sleep time 435.7 minutes.    AHI: 1.7/hr TARAN: 1.4/hr Supine AHI: 2.5/hr Lateral AHI: 2.8/hr on left and 0.3/hr on right   Average SpO2: 93% Lowest Desaturation: 81% (although this appeared to be at the beginning and end of the study while starting and ending the study)  Time Spent Below 89%: 0.4 minutes  Total Obstructive Apneas 3  Total Central/Mixed Apneas 2  Hypopneas 7    Description of Snoring: Mild    Average Pulse: 58.7 bpm  Highest Pulse: 99 bpm  Lowest Pulse: 46 bpm    Percent of time spent supine: 27.5%. He spent 25% on his left and 44% on his right.    He felt this was a normal night of sleep. He slept in a separate room to avoid disruption from his family, but also does not have report of his snoring that night compared to other nights.    Past medical/surgical history, family history, social history, medications and allergies were reviewed.      Problem List:  Patient Active Problem List    Diagnosis Date Noted     LONNY (generalized anxiety disorder) 09/19/2016     Priority: Medium     Hyperuricemia 06/25/2015     Priority: Medium     Obesity 09/03/2014     Priority: Medium     Problem list name updated by automated process. Provider to review       Prediabetes 12/19/2011     Priority: Medium     Lumbago 03/08/2011     Priority: Medium     HYPERLIPIDEMIA LDL GOAL <160 02/10/2010     Priority: Medium     Seborrhea      Priority: Medium        Impression/Plan:    (R06.83) Snoring  (primary encounter diagnosis)  Comment: This study did not show any significant sleep apnea. Based  on reports of his breathing from his wife, we were both surprised by this result. We discussed the option of repeating a study now or monitor for a while at home while working on weight loss. He felt the night of sleep during the test was a fairly decent night of sleep.  Plan: He will work on weight loss over the summer and monitor his breathing with the [facility] eduardo.       He will follow up with me in about 4-6 month(s).     Fifteen minutes spent with patient, all of which were spent face-to-face counseling, consulting, coordinating plan of care.      Bennett Goltz, PA-C    CC: No ref. provider found

## 2018-04-26 NOTE — NURSING NOTE
"Chief Complaint   Patient presents with     Study Results     HST f/u       Initial /88  Pulse 90  Resp 16  Ht 1.778 m (5' 10\")  Wt 109.4 kg (241 lb 3.2 oz)  SpO2 97%  BMI 34.61 kg/m2 Estimated body mass index is 34.61 kg/(m^2) as calculated from the following:    Height as of this encounter: 1.778 m (5' 10\").    Weight as of this encounter: 109.4 kg (241 lb 3.2 oz).  Medication Reconciliation: complete     ESS 5  Zaida Pérez    "

## 2018-04-30 ENCOUNTER — DOCUMENTATION ONLY (OUTPATIENT)
Dept: CARDIOLOGY | Facility: CLINIC | Age: 38
End: 2018-04-30

## 2018-04-30 DIAGNOSIS — I77.810 ASCENDING AORTA DILATATION (H): Primary | ICD-10-CM

## 2018-04-30 NOTE — TELEPHONE ENCOUNTER
No evidence of inducible ischemia, no arrhythmia noted.  Borderline to mildly dilated ascending aorta.  Recommend follow up echo with RTC in one year.    Thanks  Derek

## 2018-05-25 ENCOUNTER — TRANSFERRED RECORDS (OUTPATIENT)
Dept: HEALTH INFORMATION MANAGEMENT | Facility: CLINIC | Age: 38
End: 2018-05-25

## 2018-06-13 ENCOUNTER — TRANSFERRED RECORDS (OUTPATIENT)
Dept: HEALTH INFORMATION MANAGEMENT | Facility: CLINIC | Age: 38
End: 2018-06-13

## 2018-08-06 ENCOUNTER — TRANSFERRED RECORDS (OUTPATIENT)
Dept: HEALTH INFORMATION MANAGEMENT | Facility: CLINIC | Age: 38
End: 2018-08-06

## 2018-08-22 ENCOUNTER — TRANSFERRED RECORDS (OUTPATIENT)
Dept: HEALTH INFORMATION MANAGEMENT | Facility: CLINIC | Age: 38
End: 2018-08-22

## 2018-09-12 ENCOUNTER — TRANSFERRED RECORDS (OUTPATIENT)
Dept: HEALTH INFORMATION MANAGEMENT | Facility: CLINIC | Age: 38
End: 2018-09-12

## 2019-03-04 ENCOUNTER — TRANSFERRED RECORDS (OUTPATIENT)
Dept: HEALTH INFORMATION MANAGEMENT | Facility: CLINIC | Age: 39
End: 2019-03-04

## 2019-03-12 ENCOUNTER — TRANSFERRED RECORDS (OUTPATIENT)
Dept: HEALTH INFORMATION MANAGEMENT | Facility: CLINIC | Age: 39
End: 2019-03-12

## 2019-03-18 ASSESSMENT — ENCOUNTER SYMPTOMS
FEVER: 0
COUGH: 0
HEMATURIA: 0
MYALGIAS: 0
CONSTIPATION: 0
DIZZINESS: 0
FREQUENCY: 1
PALPITATIONS: 1
HEARTBURN: 1
JOINT SWELLING: 0
DYSURIA: 0
HEADACHES: 1
HEMATOCHEZIA: 0
ARTHRALGIAS: 0
NERVOUS/ANXIOUS: 1
DIARRHEA: 0
SORE THROAT: 0
CHILLS: 0
SHORTNESS OF BREATH: 0
WEAKNESS: 0
NAUSEA: 0
EYE PAIN: 0
ABDOMINAL PAIN: 0

## 2019-03-19 ENCOUNTER — OFFICE VISIT (OUTPATIENT)
Dept: PEDIATRICS | Facility: CLINIC | Age: 39
End: 2019-03-19
Payer: COMMERCIAL

## 2019-03-19 VITALS
OXYGEN SATURATION: 97 % | DIASTOLIC BLOOD PRESSURE: 70 MMHG | TEMPERATURE: 98.4 F | BODY MASS INDEX: 33.21 KG/M2 | HEART RATE: 81 BPM | WEIGHT: 232 LBS | SYSTOLIC BLOOD PRESSURE: 114 MMHG | HEIGHT: 70 IN

## 2019-03-19 DIAGNOSIS — K21.9 GASTROESOPHAGEAL REFLUX DISEASE WITHOUT ESOPHAGITIS: ICD-10-CM

## 2019-03-19 DIAGNOSIS — R51.9 INTRACTABLE HEADACHE, UNSPECIFIED CHRONICITY PATTERN, UNSPECIFIED HEADACHE TYPE: ICD-10-CM

## 2019-03-19 DIAGNOSIS — F41.1 GAD (GENERALIZED ANXIETY DISORDER): ICD-10-CM

## 2019-03-19 DIAGNOSIS — Z00.01 ENCOUNTER FOR ROUTINE ADULT MEDICAL EXAM WITH ABNORMAL FINDINGS: Primary | ICD-10-CM

## 2019-03-19 PROCEDURE — 99396 PREV VISIT EST AGE 40-64: CPT | Performed by: INTERNAL MEDICINE

## 2019-03-19 PROCEDURE — 99213 OFFICE O/P EST LOW 20 MIN: CPT | Mod: 25 | Performed by: INTERNAL MEDICINE

## 2019-03-19 RX ORDER — SUMATRIPTAN 50 MG/1
50 TABLET, FILM COATED ORAL
Qty: 18 TABLET | Refills: 6 | Status: SHIPPED | OUTPATIENT
Start: 2019-03-19

## 2019-03-19 RX ORDER — ESCITALOPRAM OXALATE 10 MG/1
TABLET ORAL
Qty: 30 TABLET | Refills: 1 | Status: SHIPPED | OUTPATIENT
Start: 2019-03-19 | End: 2019-08-27

## 2019-03-19 ASSESSMENT — ENCOUNTER SYMPTOMS
DIARRHEA: 0
NAUSEA: 0
MYALGIAS: 0
WEAKNESS: 0
NERVOUS/ANXIOUS: 1
EYE PAIN: 0
HEMATURIA: 0
ARTHRALGIAS: 0
CHILLS: 0
FEVER: 0
COUGH: 0
HEARTBURN: 1
SHORTNESS OF BREATH: 0
CONSTIPATION: 0
ABDOMINAL PAIN: 0
DYSURIA: 0
DIZZINESS: 0
JOINT SWELLING: 0
SORE THROAT: 0
HEMATOCHEZIA: 0

## 2019-03-19 ASSESSMENT — MIFFLIN-ST. JEOR: SCORE: 1968.6
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
## 2019-07-27 ENCOUNTER — HOSPITAL ENCOUNTER (EMERGENCY)
Facility: HOSPITAL | Age: 39
Discharge: HOME | End: 2019-07-28
Attending: EMERGENCY MEDICINE
Payer: COMMERCIAL

## 2019-07-27 ENCOUNTER — NURSE TRIAGE (OUTPATIENT)
Dept: NURSING | Facility: CLINIC | Age: 39
End: 2019-07-27

## 2019-07-27 VITALS
RESPIRATION RATE: 16 BRPM | BODY MASS INDEX: 34.28 KG/M2 | WEIGHT: 239.42 LBS | HEIGHT: 70 IN | OXYGEN SATURATION: 96 % | SYSTOLIC BLOOD PRESSURE: 153 MMHG | HEART RATE: 80 BPM | DIASTOLIC BLOOD PRESSURE: 95 MMHG | TEMPERATURE: 98.8 F

## 2019-07-27 DIAGNOSIS — M10.9 GOUT ATTACK: Primary | ICD-10-CM

## 2019-07-27 PROCEDURE — 99282 EMERGENCY DEPT VISIT SF MDM: CPT | Performed by: EMERGENCY MEDICINE

## 2019-07-27 RX ORDER — OMEPRAZOLE 20 MG/1
20 CAPSULE, DELAYED RELEASE ORAL DAILY PRN
COMMUNITY

## 2019-07-27 RX ORDER — ESCITALOPRAM OXALATE 10 MG/1
10 TABLET ORAL DAILY
COMMUNITY

## 2019-07-27 SDOH — HEALTH STABILITY: MENTAL HEALTH: HOW OFTEN DO YOU HAVE A DRINK CONTAINING ALCOHOL?: NEVER

## 2019-07-28 PROCEDURE — 96372 THER/PROPH/DIAG INJ SC/IM: CPT

## 2019-07-28 PROCEDURE — 6360000200 HC RX 636 W HCPCS (ALT 250 FOR IP): Performed by: EMERGENCY MEDICINE

## 2019-07-28 PROCEDURE — 6370000100 HC RX 637 (ALT 250 FOR IP): Performed by: EMERGENCY MEDICINE

## 2019-07-28 RX ORDER — HYDROCODONE BITARTRATE AND ACETAMINOPHEN 5; 325 MG/1; MG/1
2 TABLET ORAL ONCE
Status: COMPLETED | OUTPATIENT
Start: 2019-07-28 | End: 2019-07-28

## 2019-07-28 RX ORDER — KETOROLAC TROMETHAMINE 30 MG/ML
60 INJECTION, SOLUTION INTRAMUSCULAR; INTRAVENOUS ONCE
Status: COMPLETED | OUTPATIENT
Start: 2019-07-28 | End: 2019-07-28

## 2019-07-28 RX ORDER — NAPROXEN 500 MG/1
500 TABLET ORAL 2 TIMES DAILY WITH MEALS
Qty: 20 TABLET | Refills: 0 | Status: SHIPPED | OUTPATIENT
Start: 2019-07-28 | End: 2020-07-27

## 2019-07-28 RX ADMIN — HYDROCODONE BITARTRATE AND ACETAMINOPHEN 2 TABLET: 5; 325 TABLET ORAL at 00:17

## 2019-07-28 RX ADMIN — KETOROLAC TROMETHAMINE 60 MG: 30 INJECTION, SOLUTION INTRAMUSCULAR at 00:16

## 2019-07-28 NOTE — DISCHARGE INSTRUCTIONS
Follow-up with your primary doctor for discussions about gout prevention.  Take medications as directed for gout flare.  Return if new or worsening symptoms or concerns.  Follow-up with your doctor for close reevaluation.

## 2019-07-28 NOTE — TELEPHONE ENCOUNTER
Alexis is currently in [place].    He reports that he's experiencing an acute gout attack.    Advised to check with insurance company for possible nurse advice line and also to inquire about coverage in the state of [place].    Nataly Mendoza RN  [facility]        Reason for Disposition    General information question, no triage required and triager able to answer question    Protocols used: INFORMATION ONLY CALL-A-AH

## 2019-07-28 NOTE — ED PROVIDER NOTES
Room: 1    HPI:  Chief Complaint   Patient presents with   • Toe Pain     left foot pain pt states is from a gout flare up     HPI  Patient presents for evaluation of left great toe pain.  He has a history of gout and previous pain with his toe with gout flares.  He was previous on allopurinol but states he stopped as he has not had gout for a long time.  He began to have pain here today.  He denies trauma.  He has no redness or swelling or injury.  He has no other joint pains.    HISTORY:  Past Medical History:   Diagnosis Date   • Gout        History reviewed. No pertinent surgical history.    History reviewed. No pertinent family history.    Social History     Tobacco Use   • Smoking status: Never Smoker   • Smokeless tobacco: Never Used   Substance Use Topics   • Alcohol use: Never     Frequency: Never   • Drug use: Never       ROS:  Constitutional: Negative for fever.   HENT: Negative for sore throat.    Eyes: Negative for pain.   Respiratory: Negative for shortness of breath.    Cardiovascular: Negative for chest pain.    Musculoskeletal: Left great toe pain  Neurological: Negative for headaches.   Hematological: Negative for bleeding.    PHYSICAL EXAM:  ED Triage Vitals [07/27/19 2154]   Temp Heart Rate Resp BP SpO2   37.1 °C (98.8 °F) 80 16 153/95 96 %      Temp Source Heart Rate Source Patient Position BP Location FiO2 (%)   Oral -- -- -- --     Nursing note and vitals reviewed.  Constitutional: appears well-developed.   HENT: Moist oral mucosa  Head: Normocephalic and atraumatic.   Eyes: Pupils are equal, round, and reactive to light.   Neck: Supple, no lymphadenopathy  Cardiovascular: Regular rate and rhythm with no murmur, rub, or gallop.  Normal pulses.  Pulmonary/Chest: No respiratory distress.  Clear to auscultation bilaterally.  Musculoskeletal: Left great toe pain without erythema or edema  Neurological: Alert.   Skin: Skin is warm and dry. No rash noted.   Psychiatric: Normal mood and  affect.      Labs Reviewed - No data to display    No orders to display       ED Medication Administration from 07/27/2019 2151 to 07/28/2019 0026       Date/Time Order Dose Route Action Action by     07/28/2019 0017 HYDROcodone-acetaminophen (NORCO) 5-325 mg per tablet 2 tablet 2 tablet oral Given [place], T     07/28/2019 0016 ketorolac (TORADOL) injection 60 mg 60 mg intramuscular Given [place], T            PROCEDURES:  Procedures      ED COURSE:       MDM:     Patient presents with left great toe pain and history of gout.  He has had no injury here and I do not believe x-rays indicated.  He has no redness or swelling or signs of infection.  Symptoms and exam are consistent with gouty arthritis.  He has a problem with Indocin in the past causing headaches.  He was given Norco and naproxen here.  He will be discharged with naproxen anti-inflammatories and primary care follow-up for what appears to be acute gouty arthritis.      CLINICAL IMPRESSION:  Final diagnoses:   [M10.9] Gout attack   Left great toe pain      A voice recognition program was used to aid in documentation of this record.  Sometimes words are not printed exactly as they were spoken.  While efforts were made to carefully edit and correct any inaccuracies, some areas may be present; please take these into context.  Please contact the provider if areas are identified.             Verna Peralta MD  08/01/19 0045

## 2019-08-07 ENCOUNTER — OFFICE VISIT (OUTPATIENT)
Dept: URGENT CARE | Facility: URGENT CARE | Age: 39
End: 2019-08-07
Payer: COMMERCIAL

## 2019-08-07 ENCOUNTER — MYC MEDICAL ADVICE (OUTPATIENT)
Dept: PEDIATRICS | Facility: CLINIC | Age: 39
End: 2019-08-07

## 2019-08-07 VITALS
WEIGHT: 231 LBS | BODY MASS INDEX: 33.15 KG/M2 | OXYGEN SATURATION: 98 % | HEART RATE: 65 BPM | DIASTOLIC BLOOD PRESSURE: 72 MMHG | SYSTOLIC BLOOD PRESSURE: 118 MMHG | TEMPERATURE: 98.6 F

## 2019-08-07 DIAGNOSIS — R63.1 INCREASED THIRST: ICD-10-CM

## 2019-08-07 DIAGNOSIS — M10.9 ACUTE GOUT INVOLVING TOE OF RIGHT FOOT, UNSPECIFIED CAUSE: Primary | ICD-10-CM

## 2019-08-07 LAB
ANION GAP SERPL CALCULATED.3IONS-SCNC: 8 MMOL/L (ref 3–14)
BUN SERPL-MCNC: 11 MG/DL (ref 7–30)
CALCIUM SERPL-MCNC: 9.5 MG/DL (ref 8.5–10.1)
CHLORIDE SERPL-SCNC: 104 MMOL/L (ref 94–109)
CO2 SERPL-SCNC: 29 MMOL/L (ref 20–32)
CREAT SERPL-MCNC: 1 MG/DL (ref 0.66–1.25)
GFR SERPL CREATININE-BSD FRML MDRD: >90 ML/MIN/{1.73_M2}
GLUCOSE SERPL-MCNC: 100 MG/DL (ref 70–99)
POTASSIUM SERPL-SCNC: 3.8 MMOL/L (ref 3.4–5.3)
SODIUM SERPL-SCNC: 141 MMOL/L (ref 133–144)

## 2019-08-07 PROCEDURE — 36415 COLL VENOUS BLD VENIPUNCTURE: CPT | Performed by: PHYSICIAN ASSISTANT

## 2019-08-07 PROCEDURE — 99214 OFFICE O/P EST MOD 30 MIN: CPT | Performed by: PHYSICIAN ASSISTANT

## 2019-08-07 PROCEDURE — 84550 ASSAY OF BLOOD/URIC ACID: CPT | Performed by: PHYSICIAN ASSISTANT

## 2019-08-07 PROCEDURE — 80048 BASIC METABOLIC PNL TOTAL CA: CPT | Performed by: PHYSICIAN ASSISTANT

## 2019-08-07 RX ORDER — COLCHICINE 0.6 MG/1
TABLET ORAL
Qty: 3 TABLET | Refills: 0 | Status: SHIPPED | OUTPATIENT
Start: 2019-08-07 | End: 2019-08-12

## 2019-08-07 RX ORDER — PREDNISONE 20 MG/1
40 TABLET ORAL DAILY
Qty: 10 TABLET | Refills: 0 | Status: SHIPPED | OUTPATIENT
Start: 2019-08-07 | End: 2019-09-18

## 2019-08-07 NOTE — PATIENT INSTRUCTIONS
Patient Education     Treating Gout Attacks     Raising the joint above the level of your heart can help reduce gout symptoms.     Gout is a disease that affects the joints. It is caused by excess uric acid in your blood that may lead to crystals forming in your joints. Left untreated, it can lead to painful foot and joint deformities and even kidney problems. But, by treating gout early, you can relieve pain and help prevent future problems. Gout can usually be treated with medicine and proper diet. In severe cases, surgery may be needed.  Gout attacks are painful and often happen more than once. Taking medicines may reduce pain and prevent attacks in the future. There are also some things you can do at home to relieve symptoms.  Medicines for gout  Your healthcare provider may prescribe a daily medicine to reduce levels of uric acid. Reducing your uric acid levels may help prevent gout attacks. Allopurinol is one commonly used medicine taken daily to reduce uric acid levels. Other daily medicines used to reduce uric acid levels include febuxostat, lesinurad, and probencid. Other medicines can help relieve pain and swelling during an acute attack. Medicines such as NSAIDs (nonsteroidal anti-inflammatory medicines), steroids, and colchicine may be prescribed for intermittent use to relieve an acute gout attack. Be sure to take your medicine as directed.  What you can do  Below are some things you can do at home to relieve gout symptoms. Your healthcare provider may have other tips.    Rest the painful joint as much as you can.    Raise the painful joint so it is at a level higher than your heart.    Use ice for 10 minutes every 1 to 2 hours as possible.  How can I prevent gout?  With a little effort, you may be able to prevent gout attacks in the future. Here are some things you can do:    Don't eat foods high in purines  ? Certain meats (red meat, processed meat, turkey)  ? Organ meats (kidney, liver,  sweetbread)  ? Shellfish (lobster, crab, shrimp, scallop, mussel)  ? Certain fish (anchovy, sardine, herring, mackerel)    Take any medicines prescribed by your healthcare provider.    Lose weight if you need to.    Reduce high fructose corn syrup in meals and drinks.    Reduce or cut out alcohol, particularly beer, but also red wine and spirits.    Control blood pressure, diabetes, and cholesterol.    Drink plenty of water to help flush uric acid from your body.  Date Last Reviewed: 4/1/2018 2000-2018 [employer]. [address] [place] [address]. All rights reserved. This information is not intended as a substitute for professional medical care. Always follow your healthcare professional's instructions.           Patient Education     Eating to Prevent Gout  Gout is a painful form of arthritis caused by an excess of uric acid. This is a waste product made by the body. It builds up in the body and forms crystals that collect in the joints, causing a gout attack. Alcohol and certain foods can trigger a gout attack. Below are some guidelines for changing your diet to help you manage gout. Your healthcare provider can work with you to determine the best eating plan for you. Know that diet is only one part of managing gout. Take your medicines as prescribed and follow the other guidelines your healthcare provider has given you.  Foods to limit  Eating too many foods containing purines may increase the levels of uric acid in your body and increase your risk for a gout attack. It may be best to limit these high-purine foods:    Alcohol (beer and red wine). You may be told to avoid alcohol completely.    Certain fish (anchovies, sardines, fish roes, herring, tuna, mussels, codfish, scallops, trout, and xochitl)    Certain meats (red meat, processed meat, toledo, turkey, wild game, and goose)    Sauces and gravies made with meat    Organ meats (such as liver, kidneys, sweetbreads, and  tripe)    Legumes (such as dried beans and peas)    Mushrooms, spinach, asparagus, and cauliflower    Yeast and yeast extract supplements  Foods to try  Some foods may be helpful for people with gout. You may want to try adding some of the following foods to your diet:    Dark berries. These include blueberries, blackberries, and cherries. These berries contain chemicals that may lower uric acid.    Tofu. Tofu, which is made from soy, is a good source of protein. Studies have shown that it may be a better choice than meat for people with gout.    Omega fatty acids. These acids are found in fatty fish (such as salmon), certain oils (such as flax, olive, or nut oils), or nuts. They may help prevent inflammation due to gout.  The following guidelines are recommended by the American Medical Association for people with gout. Your diet should be:    High in fiber, whole grains, fruits, and vegetables.    Low in protein (15% of calories should come from protein. Choose lean sources, such as soy, lean meats, and poultry).    Low in fat (no more than 30% of calories should come from fat, with only 10% coming from animal, or saturated, fat).   Date Last Reviewed: 11/1/2017 2000-2018 [email or web address]. [address] [place] [address], [place] [address]. All rights reserved. This information is not intended as a substitute for professional medical care. Always follow your healthcare professional's instructions.

## 2019-08-08 ENCOUNTER — MYC MEDICAL ADVICE (OUTPATIENT)
Dept: PEDIATRICS | Facility: CLINIC | Age: 39
End: 2019-08-08

## 2019-08-08 LAB — URATE SERPL-MCNC: 5 MG/DL (ref 3.5–7.2)

## 2019-08-08 NOTE — TELEPHONE ENCOUNTER
Spoke with the pt.  Informed pt of uric acid level.    He stopped taking gout medication in 2016 and is requesting to restart the mediation again, due to recent gout attacks.  He was seen in  on 8/7/19 for gout and was instructed to f/u with his primary for new daily rx.      Pt is currently out of town, but will call back next week for an appt with a same day provider, to receive new rx, prior to his trip, next Friday.    Ida Santillan, ROSE - [facility]

## 2019-08-08 NOTE — TELEPHONE ENCOUNTER
Pt sent a [employer] message requesting f/u appt with .  See [employer] message.    Ida Santillan, RN - Triage  [facility]

## 2019-08-12 ENCOUNTER — OFFICE VISIT (OUTPATIENT)
Dept: PEDIATRICS | Facility: CLINIC | Age: 39
End: 2019-08-12
Payer: COMMERCIAL

## 2019-08-12 VITALS
WEIGHT: 233.3 LBS | DIASTOLIC BLOOD PRESSURE: 80 MMHG | SYSTOLIC BLOOD PRESSURE: 120 MMHG | BODY MASS INDEX: 33.4 KG/M2 | HEART RATE: 72 BPM | TEMPERATURE: 97.6 F | HEIGHT: 70 IN | OXYGEN SATURATION: 97 %

## 2019-08-12 DIAGNOSIS — M1A.9XX0 CHRONIC GOUT WITHOUT TOPHUS, UNSPECIFIED CAUSE, UNSPECIFIED SITE: ICD-10-CM

## 2019-08-12 DIAGNOSIS — M10.9 ACUTE GOUT OF MULTIPLE SITES, UNSPECIFIED CAUSE: Primary | ICD-10-CM

## 2019-08-12 PROCEDURE — 36415 COLL VENOUS BLD VENIPUNCTURE: CPT | Performed by: PHYSICIAN ASSISTANT

## 2019-08-12 PROCEDURE — 99214 OFFICE O/P EST MOD 30 MIN: CPT | Performed by: PHYSICIAN ASSISTANT

## 2019-08-12 PROCEDURE — 80076 HEPATIC FUNCTION PANEL: CPT | Performed by: PHYSICIAN ASSISTANT

## 2019-08-12 RX ORDER — ALLOPURINOL 100 MG/1
100 TABLET ORAL DAILY
Qty: 60 TABLET | Refills: 1 | Status: SHIPPED | OUTPATIENT
Start: 2019-08-12 | End: 2019-09-18

## 2019-08-12 RX ORDER — NAPROXEN 500 MG/1
500 TABLET ORAL 2 TIMES DAILY WITH MEALS
Qty: 60 TABLET | Refills: 0 | Status: SHIPPED | OUTPATIENT
Start: 2019-08-12 | End: 2020-11-24

## 2019-08-12 RX ORDER — PREDNISONE 10 MG/1
30 TABLET ORAL DAILY
Qty: 15 TABLET | Refills: 0 | Status: SHIPPED | OUTPATIENT
Start: 2019-08-12 | End: 2019-09-18

## 2019-08-12 ASSESSMENT — MIFFLIN-ST. JEOR: SCORE: 1969.49

## 2019-08-12 NOTE — PROGRESS NOTES
"Subjective     Alexis Piña is a 39 year old male, with a history of gout, who presents to clinic today for the following health issues:    Patient discontinued allopurinol one year ago since so well controlled.     HPI   Patient states that he had gout flare three weeks ago in the left foot. Patient seen in  out of town and given toradol and aleve. He was told to restart his allopurinol 300mg at that time. Patient was in [place] and walking quite a bit.     Symptoms improved. Colchicine caused GI upset.    Patient was home for a few days and symptoms returned in right 1st MTP joint. Patient seen in  and given colchicine and prednisone on 8/7/19. Symptoms are overall improving. Still has one day of prednisone left. Colchicine caused GI upset. Labs completed last week.    Leaving for [place] in three days for a remote backpacking trip for 7 days total.    Review of Systems   ROS COMP: Constitutional, HEENT, cardiovascular, pulmonary, gi and gu systems are negative, except as otherwise noted.      Objective    /80 (BP Location: Right arm, Patient Position: Sitting, Cuff Size: Adult Large)   Pulse 72   Temp 97.6  F (36.4  C) (Oral)   Ht 1.778 m (5' 10\")   Wt 105.8 kg (233 lb 4.8 oz)   SpO2 97%   BMI 33.48 kg/m    Body mass index is 33.48 kg/m .  Physical Exam   GENERAL: healthy, alert and no distress  RESP: lungs clear to auscultation - no rales, rhonchi or wheezes  CV: regular rate and rhythm, normal S1 S2, no S3 or S4  MS: mild tenderness of the right 1st MTP joint. No edema, erythema. Full ROM.    Diagnostic Test Results:  No results found for this or any previous visit (from the past 24 hour(s)).        Assessment & Plan   1. Acute gout of multiple sites, unspecified cause  Likely flare with the start of allopurinol. Discontinue.   Finish course of prednisone. Continue with naprosyn as directed. Restart prilosec daily.   Since patient leaving for trip, new prednisone burst given. May take if " patient has flare.   Push fluids, rest, ice, elevate. No alcohol.   - predniSONE (DELTASONE) 10 MG tablet; Take 3 tablets (30 mg) by mouth daily for 5 days  Dispense: 15 tablet; Refill: 0  - Hepatic panel  - naproxen (NAPROSYN) 500 MG tablet; Take 1 tablet (500 mg) by mouth 2 times daily (with meals)  Dispense: 60 tablet; Refill: 0    2. Chronic gout without tophus, unspecified cause, unspecified site  After patient returns from trip and is asymptomatic, begin allopurinol at low dose. Continue at this dose until seen by PMD. Continue with naprosyn. Appointment scheduled.   - allopurinol (ZYLOPRIM) 100 MG tablet; Take 1 tablet (100 mg) by mouth daily Increase to 2 tabs daily if tolerating well after two weeks  Dispense: 60 tablet; Refill: 1     Roxana Gold PA-C  [facility]

## 2019-08-12 NOTE — PATIENT INSTRUCTIONS
Continue with NSAIDs --aleve twice daily. Take with food.   Begin prilosec  Push fluids, rest, ice, elevate until trip.   Take oral prednisone with you and start if flare occurs.     When all symptoms have resolved, start allopurinol 100 mg.   Follow up with Dr. Monroe. May increase if you are not well controlled.

## 2019-08-13 LAB
ALBUMIN SERPL-MCNC: 4 G/DL (ref 3.4–5)
ALP SERPL-CCNC: 70 U/L (ref 40–150)
ALT SERPL W P-5'-P-CCNC: 40 U/L (ref 0–70)
AST SERPL W P-5'-P-CCNC: 17 U/L (ref 0–45)
BILIRUB DIRECT SERPL-MCNC: 0.1 MG/DL (ref 0–0.2)
BILIRUB SERPL-MCNC: 0.6 MG/DL (ref 0.2–1.3)
PROT SERPL-MCNC: 7.1 G/DL (ref 6.8–8.8)

## 2019-08-13 NOTE — PROGRESS NOTES
SUBJECTIVE:  Chief Complaint   Patient presents with     Urgent Care     Toe Pain     R great toe pain x 1 day- hx of Gout      Alexis Piña is a 39 year old male who presents with a chief complaint of right toe(s) great pain.  Symptoms began 1 day(s) ago, are moderate and sudden onset and worsening  Context:  Injury:No.  H/o gout    Feels like he has been extra thirsty lately     Past Medical History:   Diagnosis Date     Depressive disorder      Pure hypercholesterolemia      Seborrhea      Tobacco use disorder      Vitamin D deficiency 12/22/2011     Current Outpatient Medications   Medication Sig Dispense Refill     escitalopram (LEXAPRO) 10 MG tablet One half tab daily for 2 weeks then increase to 1 tab daily 30 tablet 1     mometasone (ELOCON) 0.1 % ointment Apply sparingly to affected area. 30 g 0     omeprazole (PRILOSEC) 20 MG DR capsule Take 1 capsule (20 mg) by mouth daily as needed 90 capsule 3     SUMAtriptan (IMITREX) 50 MG tablet Take 1 tablet (50 mg) by mouth at onset of headache for migraine May repeat dose in 2 hours.  Do not exceed 200 mg in 24 hours 18 tablet 6     allopurinol (ZYLOPRIM) 100 MG tablet Take 1 tablet (100 mg) by mouth daily Increase to 2 tabs daily if tolerating well after two weeks 60 tablet 1     naproxen (NAPROSYN) 500 MG tablet Take 1 tablet (500 mg) by mouth 2 times daily (with meals) 60 tablet 0     predniSONE (DELTASONE) 10 MG tablet Take 3 tablets (30 mg) by mouth daily for 5 days 15 tablet 0     Social History     Tobacco Use     Smoking status: Former Smoker     Smokeless tobacco: Never Used     Tobacco comment: quit 12/03/2005   Substance Use Topics     Alcohol use: Yes     Alcohol/week: 20.0 oz     Comment: 3 times per week, 3 drinks       ROS:  10 point ROS negative except as listed above      EXAM:   /72   Pulse 65   Temp 98.6  F (37  C) (Tympanic)   Wt 104.8 kg (231 lb)   SpO2 98%   BMI 33.15 kg/m    M/S Exam:toe(s) greaterythema, tenderness to palpation and  FROMGENERAL APPEARANCE: healthy, alert and no distress  CHEST: clear to auscultation  CV: regular rate and rhythm  EXTREMITIES: peripheral pulses normal  MS: no gross deformities noted, no evidence of inflammation in joints, FROM in all extremities.  SKIN: no suspicious lesions or rashes  NEURO: Normal strength and tone, sensory exam grossly normal, mentation intact and speech normal    Results for orders placed or performed in visit on 08/07/19   Basic metabolic panel   Result Value Ref Range    Sodium 141 133 - 144 mmol/L    Potassium 3.8 3.4 - 5.3 mmol/L    Chloride 104 94 - 109 mmol/L    Carbon Dioxide 29 20 - 32 mmol/L    Anion Gap 8 3 - 14 mmol/L    Glucose 100 (H) 70 - 99 mg/dL    Urea Nitrogen 11 7 - 30 mg/dL    Creatinine 1.00 0.66 - 1.25 mg/dL    GFR Estimate >90 >60 mL/min/[1.73_m2]    GFR Estimate If Black >90 >60 mL/min/[1.73_m2]    Calcium 9.5 8.5 - 10.1 mg/dL   Uric acid   Result Value Ref Range    Uric Acid 5.0 3.5 - 7.2 mg/dL         ASSESSMENT:  (M10.9) Acute gout involving toe of right foot, unspecified cause  (primary encounter diagnosis)  Plan: Basic metabolic panel, Uric acid, predniSONE         (DELTASONE) 20 MG tablet, DISCONTINUED:         colchicine (COLCYRS) 0.6 MG tablet      (R63.1) Increased thirst  Comment: electrolytes and sugar WNL  Plan: Basic metabolic panel  Follow up if persisting    Patient Instructions       Patient Education     Treating Gout Attacks     Raising the joint above the level of your heart can help reduce gout symptoms.     Gout is a disease that affects the joints. It is caused by excess uric acid in your blood that may lead to crystals forming in your joints. Left untreated, it can lead to painful foot and joint deformities and even kidney problems. But, by treating gout early, you can relieve pain and help prevent future problems. Gout can usually be treated with medicine and proper diet. In severe cases, surgery may be needed.  Gout attacks are painful and  often happen more than once. Taking medicines may reduce pain and prevent attacks in the future. There are also some things you can do at home to relieve symptoms.  Medicines for gout  Your healthcare provider may prescribe a daily medicine to reduce levels of uric acid. Reducing your uric acid levels may help prevent gout attacks. Allopurinol is one commonly used medicine taken daily to reduce uric acid levels. Other daily medicines used to reduce uric acid levels include febuxostat, lesinurad, and probencid. Other medicines can help relieve pain and swelling during an acute attack. Medicines such as NSAIDs (nonsteroidal anti-inflammatory medicines), steroids, and colchicine may be prescribed for intermittent use to relieve an acute gout attack. Be sure to take your medicine as directed.  What you can do  Below are some things you can do at home to relieve gout symptoms. Your healthcare provider may have other tips.    Rest the painful joint as much as you can.    Raise the painful joint so it is at a level higher than your heart.    Use ice for 10 minutes every 1 to 2 hours as possible.  How can I prevent gout?  With a little effort, you may be able to prevent gout attacks in the future. Here are some things you can do:    Don't eat foods high in purines  ? Certain meats (red meat, processed meat, turkey)  ? Organ meats (kidney, liver, sweetbread)  ? Shellfish (lobster, crab, shrimp, scallop, mussel)  ? Certain fish (anchovy, sardine, herring, mackerel)    Take any medicines prescribed by your healthcare provider.    Lose weight if you need to.    Reduce high fructose corn syrup in meals and drinks.    Reduce or cut out alcohol, particularly beer, but also red wine and spirits.    Control blood pressure, diabetes, and cholesterol.    Drink plenty of water to help flush uric acid from your body.  Date Last Reviewed: 4/1/2018 2000-2018 [employer]. [address] [place] [address], [place] [address]. All  rights reserved. This information is not intended as a substitute for professional medical care. Always follow your healthcare professional's instructions.           Patient Education     Eating to Prevent Gout  Gout is a painful form of arthritis caused by an excess of uric acid. This is a waste product made by the body. It builds up in the body and forms crystals that collect in the joints, causing a gout attack. Alcohol and certain foods can trigger a gout attack. Below are some guidelines for changing your diet to help you manage gout. Your healthcare provider can work with you to determine the best eating plan for you. Know that diet is only one part of managing gout. Take your medicines as prescribed and follow the other guidelines your healthcare provider has given you.  Foods to limit  Eating too many foods containing purines may increase the levels of uric acid in your body and increase your risk for a gout attack. It may be best to limit these high-purine foods:    Alcohol (beer and red wine). You may be told to avoid alcohol completely.    Certain fish (anchovies, sardines, fish roes, herring, tuna, mussels, codfish, scallops, trout, and xochitl)    Certain meats (red meat, processed meat, toledo, turkey, wild game, and goose)    Sauces and gravies made with meat    Organ meats (such as liver, kidneys, sweetbreads, and tripe)    Legumes (such as dried beans and peas)    Mushrooms, spinach, asparagus, and cauliflower    Yeast and yeast extract supplements  Foods to try  Some foods may be helpful for people with gout. You may want to try adding some of the following foods to your diet:    Dark berries. These include blueberries, blackberries, and cherries. These berries contain chemicals that may lower uric acid.    Tofu. Tofu, which is made from soy, is a good source of protein. Studies have shown that it may be a better choice than meat for people with gout.    Omega fatty acids. These acids are found in  fatty fish (such as salmon), certain oils (such as flax, olive, or nut oils), or nuts. They may help prevent inflammation due to gout.  The following guidelines are recommended by the American Medical Association for people with gout. Your diet should be:    High in fiber, whole grains, fruits, and vegetables.    Low in protein (15% of calories should come from protein. Choose lean sources, such as soy, lean meats, and poultry).    Low in fat (no more than 30% of calories should come from fat, with only 10% coming from animal, or saturated, fat).   Date Last Reviewed: 11/1/2017 2000-2018 [employer]. [address] [place] [address], [place] [address]. All rights reserved. This information is not intended as a substitute for professional medical care. Always follow your healthcare professional's instructions.

## 2019-08-16 ENCOUNTER — TRANSFERRED RECORDS (OUTPATIENT)
Dept: HEALTH INFORMATION MANAGEMENT | Facility: CLINIC | Age: 39
End: 2019-08-16

## 2019-08-27 ENCOUNTER — MYC REFILL (OUTPATIENT)
Dept: PEDIATRICS | Facility: CLINIC | Age: 39
End: 2019-08-27

## 2019-08-27 DIAGNOSIS — F41.1 GAD (GENERALIZED ANXIETY DISORDER): ICD-10-CM

## 2019-08-27 RX ORDER — ESCITALOPRAM OXALATE 10 MG/1
TABLET ORAL
Qty: 30 TABLET | Refills: 0 | OUTPATIENT
Start: 2019-08-27

## 2019-08-27 RX ORDER — ESCITALOPRAM OXALATE 10 MG/1
10 TABLET ORAL DAILY
Qty: 90 TABLET | Refills: 3 | Status: SHIPPED | OUTPATIENT
Start: 2019-08-27 | End: 2020-11-23

## 2019-08-27 NOTE — TELEPHONE ENCOUNTER
Routing refill request to provider for review/approval because:  Titrated dose      Deborah Cuellar RN

## 2019-08-27 NOTE — TELEPHONE ENCOUNTER
"Requested Prescriptions   Pending Prescriptions Disp Refills     escitalopram (LEXAPRO) 10 MG tablet [Pharmacy Med Name: ESCITALOPRAM 10MG TABLETS]    Last Written Prescription Date:  3/19/2019  Last Fill Quantity: 90,  # refills: 1   Last office visit: 8/12/2019 with prescribing provider:  Tray Monroe     Future Office Visit:   Next 5 appointments (look out 90 days)    Sep 18, 2019  9:50 AM CDT  Office visit with Tray Monroe MD,  EXAM ROOM [address]  [address] [address]  [phone]          30 tablet 0     Sig: TAKE 1/2 TABLET BY MOUTH DAILY FOR 2 WEEKS THEN INCREASE TO 1 TABLET DAILY       SSRIs Protocol Passed - 8/27/2019 10:37 AM        Passed - Recent (12 mo) or future (30 days) visit within the authorizing provider's specialty     Patient had office visit in the last 12 months or has a visit in the next 30 days with authorizing provider or within the authorizing provider's specialty.  See \"Patient Info\" tab in inbasket, or \"Choose Columns\" in Meds & Orders section of the refill encounter.              Passed - Medication is active on med list        Passed - Patient is age 18 or older          "

## 2019-09-18 ENCOUNTER — OFFICE VISIT (OUTPATIENT)
Dept: PEDIATRICS | Facility: CLINIC | Age: 39
End: 2019-09-18
Payer: COMMERCIAL

## 2019-09-18 VITALS
BODY MASS INDEX: 32.28 KG/M2 | TEMPERATURE: 98.8 F | SYSTOLIC BLOOD PRESSURE: 104 MMHG | WEIGHT: 225 LBS | OXYGEN SATURATION: 96 % | DIASTOLIC BLOOD PRESSURE: 70 MMHG | HEART RATE: 67 BPM

## 2019-09-18 DIAGNOSIS — E78.5 HYPERLIPIDEMIA LDL GOAL <160: ICD-10-CM

## 2019-09-18 DIAGNOSIS — F41.1 GAD (GENERALIZED ANXIETY DISORDER): ICD-10-CM

## 2019-09-18 DIAGNOSIS — M1A.9XX0 CHRONIC GOUT WITHOUT TOPHUS, UNSPECIFIED CAUSE, UNSPECIFIED SITE: Primary | ICD-10-CM

## 2019-09-18 LAB
ALBUMIN SERPL-MCNC: 4.5 G/DL (ref 3.4–5)
ALP SERPL-CCNC: 78 U/L (ref 40–150)
ALT SERPL W P-5'-P-CCNC: 45 U/L (ref 0–70)
ANION GAP SERPL CALCULATED.3IONS-SCNC: 6 MMOL/L (ref 3–14)
AST SERPL W P-5'-P-CCNC: 18 U/L (ref 0–45)
BILIRUB SERPL-MCNC: 0.7 MG/DL (ref 0.2–1.3)
BUN SERPL-MCNC: 18 MG/DL (ref 7–30)
CALCIUM SERPL-MCNC: 9.2 MG/DL (ref 8.5–10.1)
CHLORIDE SERPL-SCNC: 105 MMOL/L (ref 94–109)
CHOLEST SERPL-MCNC: 225 MG/DL
CO2 SERPL-SCNC: 25 MMOL/L (ref 20–32)
CREAT SERPL-MCNC: 0.85 MG/DL (ref 0.66–1.25)
GFR SERPL CREATININE-BSD FRML MDRD: >90 ML/MIN/{1.73_M2}
GLUCOSE SERPL-MCNC: 102 MG/DL (ref 70–99)
HDLC SERPL-MCNC: 33 MG/DL
LDLC SERPL CALC-MCNC: ABNORMAL MG/DL
LDLC SERPL DIRECT ASSAY-MCNC: 116 MG/DL
NONHDLC SERPL-MCNC: 192 MG/DL
POTASSIUM SERPL-SCNC: 4 MMOL/L (ref 3.4–5.3)
PROT SERPL-MCNC: 8 G/DL (ref 6.8–8.8)
SODIUM SERPL-SCNC: 136 MMOL/L (ref 133–144)
TRIGL SERPL-MCNC: 442 MG/DL

## 2019-09-18 PROCEDURE — 36415 COLL VENOUS BLD VENIPUNCTURE: CPT | Performed by: INTERNAL MEDICINE

## 2019-09-18 PROCEDURE — 80053 COMPREHEN METABOLIC PANEL: CPT | Performed by: INTERNAL MEDICINE

## 2019-09-18 PROCEDURE — 83721 ASSAY OF BLOOD LIPOPROTEIN: CPT | Performed by: INTERNAL MEDICINE

## 2019-09-18 PROCEDURE — 80061 LIPID PANEL: CPT | Performed by: INTERNAL MEDICINE

## 2019-09-18 PROCEDURE — 99214 OFFICE O/P EST MOD 30 MIN: CPT | Performed by: INTERNAL MEDICINE

## 2019-09-18 RX ORDER — ALLOPURINOL 300 MG/1
300 TABLET ORAL DAILY
Qty: 90 TABLET | Refills: 3 | Status: SHIPPED | OUTPATIENT
Start: 2019-09-18 | End: 2020-09-22

## 2019-09-18 NOTE — PROGRESS NOTES
Subjective     Alexis Piña is a 39 year old male who presents to clinic today for the following health issues:    Arthritis        Gout  Duration: episode in August  Description   Location: First metatarsal  Joint Swelling: YES  Redness: YES  Pain intensity:  moderate  Accompanying signs and symptoms: None  History  Previous history of gout: Presents today for follow-up on gout.  More than 1 year ago, patient discontinued allopurinol as a trial.  Had been taking allopurinol for a few years prior to that secondary to an episode of gout involving his first metatarsal.  He did well until earlier this summer when he developed acute onset of first metatarsal joint swelling and redness which unfortunately prompted an ER visit while he was traveling on vacation with family.  Treated with prednisone and instructed to resume allopurinol during the gout flare.  Patient returned to the [facility] with persistent pain and swelling, went to urgent care and again treated with course of prednisone and was instructed to discontinue allopurinol that may have aggravated gout symptoms during an acute flare.  At this point patient is 4-5 weeks out from most recent urgent care visit and states that first metatarsal pain has largely resolved.  Does have some mild paresthesias of the second through fourth toes of the right foot.  Patient denies alcohol use and has been avoiding certain foods that have triggered flares for him in the past.  Trauma to the area: no   Precipitating factors:   Alcohol usage: none  Diuretic use: no   Recent illness: no   Therapies tried and outcome: None    Recent Labs   Lab Test 09/18/19  1057 01/11/18  1030  12/14/11  0920   CHOL  211*  --  202*   HDL  35*  --  27*   LDL  Cannot estimate LDL when triglyceride exceeds 400 mg/dL  108*   < > 103   TRIG  462*  --  360*   CHOLHDLRATIO  --   --   --  7.5*    < > = values in this interval not displayed.       History of hyperlipidemia- continues to work on diet and  weight reduction, fasting today for follow-up lab work.    History of generalized anxiety disorder-stable on current regimen.      Patient Active Problem List   Diagnosis     Seborrhea     HYPERLIPIDEMIA LDL GOAL <160     Lumbago     Prediabetes     Obesity     LONNY (generalized anxiety disorder)     Chronic gout without tophus, unspecified cause, unspecified site     Past Surgical History:   Procedure Laterality Date     NO HISTORY OF SURGERY         Social History     Tobacco Use     Smoking status: Former Smoker     Smokeless tobacco: Never Used     Tobacco comment: quit 12/03/2005   Substance Use Topics     Alcohol use: Yes     Alcohol/week: 20.0 oz     Comment: 3 times per week, 3 drinks     Family History   Problem Relation Age of Onset     Neurologic Disorder Mother         MS     Diabetes Paternal Grandmother      Cancer Father         father diagnose with bladder cancer at age 61 yrs old     Coronary Artery Disease Father      C.A.D. No family hx of      Hypertension No family hx of      Cancer - colorectal No family hx of      Prostate Cancer No family hx of          Current Outpatient Medications   Medication Sig Dispense Refill     allopurinol (ZYLOPRIM) 300 MG tablet Take 1 tablet (300 mg) by mouth daily 90 tablet 3     escitalopram (LEXAPRO) 10 MG tablet Take 1 tablet (10 mg) by mouth daily 90 tablet 3     mometasone (ELOCON) 0.1 % ointment Apply sparingly to affected area. 30 g 0     naproxen (NAPROSYN) 500 MG tablet Take 1 tablet (500 mg) by mouth 2 times daily (with meals) 60 tablet 0     omeprazole (PRILOSEC) 20 MG DR capsule Take 1 capsule (20 mg) by mouth daily as needed 90 capsule 3     SUMAtriptan (IMITREX) 50 MG tablet Take 1 tablet (50 mg) by mouth at onset of headache for migraine May repeat dose in 2 hours.  Do not exceed 200 mg in 24 hours 18 tablet 6       ROS: The following systems have been completely reviewed and are negative except as noted in the HPI: CONSTITUTIONAL, CARDIOVASCULAR,  PULMONARY, GASTROINTESTINAL    OBJECTIVE:                                                    /70 (Cuff Size: Adult Regular)   Pulse 67   Temp 98.8  F (37.1  C) (Oral)   Wt 102.1 kg (225 lb)   SpO2 96%   BMI 32.28 kg/m   Body mass index is 32.28 kg/m .  GENERAL:  alert,  no distress  RESP: lungs clear to auscultation - no rales, no rhonchi, no wheezes  CV: regular rates and rhythm, normal S1 S2, no S3 or S4 and no murmur, no click or rub   MS: extremities- no edema     Feet: First MTP joints of both feet nontender to palpation without erythema warmth or effusions.  No evidence of acute inflammatory arthritis     ASSESSMENT/PLAN:                                                        ICD-10-CM    1. Chronic gout without tophus, unspecified cause, unspecified site [id number].[id number] allopurinol (ZYLOPRIM) 300 MG tablet      Recurrent symptoms off prophylactic allopurinol.  No evidence of acute gout flare on exam today-recommended patient resume allopurinol 300 mg daily and return for repeat uric acid and CBC in the next few months.             2. Hyperlipidemia LDL goal <160 E78.5 Comprehensive metabolic panel     Lipid panel reflex to direct LDL Fasting  Reviewed dietary changes, repeat fasting lab work today.     3. LONNY (generalized anxiety disorder) F41.1 Adequate control.  Continue current regimen without changes.       Tray Monroe MD  [facility]

## 2019-11-02 ENCOUNTER — HEALTH MAINTENANCE LETTER (OUTPATIENT)
Age: 39
End: 2019-11-02

## 2020-02-26 ENCOUNTER — TRANSFERRED RECORDS (OUTPATIENT)
Dept: HEALTH INFORMATION MANAGEMENT | Facility: CLINIC | Age: 40
End: 2020-02-26

## 2020-03-27 ENCOUNTER — TELEPHONE (OUTPATIENT)
Dept: PEDIATRICS | Facility: CLINIC | Age: 40
End: 2020-03-27

## 2020-03-27 NOTE — TELEPHONE ENCOUNTER
Patient Returning Call  Reason for call:  Patient has symptoms that he wants to talk to the nurse about  Information relayed to patient:  Nurse will call him back  Patient has additional questions:  Yes  What are your questions/concerns:  Patient has nausea, dizziness, and vertigo for the last few days. He thinks it's medication related. He sees a neurologist but he cannot get a hold of anybody at that office. He would like to talk to a nurse regarding his symptoms. Please call him back.  Okay to leave a detailed message?: Yes at Home number on file [phone] (home)

## 2020-03-27 NOTE — TELEPHONE ENCOUNTER
"Patient follows from [facility] for migraines     Started on Topamax 3 weeks ago, titrated up to full dose 100mg On Topamax 100 mg - symptoms started this medication but milk intermittent, wake up in middle of night with \"bed spins\"     Titrate: Topamax 25mg tabs to 100mg daily  50 mg 1 week  75mg 1 week  100mg since last week     Last 2 days  2 nights ago -significant nausea/dizziness   Last night: vertigo sx: dizziness, unsteadiness, nausea continuing through today    Patient has tried following up with [facility] but has not heard back. Wondering what next steps are. Huddled with another provider in clinic CAMILA Ng, as PCP is out of office. Primary recommendations is to continue to attempt to follow up with Neurology since we do not have any updated records on file, if unable to reach neurology could try cutting dose back to 75mg to see if that is tolerated better. Patient verbalized understanding will attempt to get in touch with neurology clinic  "

## 2020-05-20 ENCOUNTER — E-VISIT (OUTPATIENT)
Dept: PEDIATRICS | Facility: CLINIC | Age: 40
End: 2020-05-20
Payer: COMMERCIAL

## 2020-05-20 DIAGNOSIS — S63.609A: Primary | ICD-10-CM

## 2020-05-20 PROCEDURE — 99421 OL DIG E/M SVC 5-10 MIN: CPT | Performed by: INTERNAL MEDICINE

## 2020-07-07 ENCOUNTER — TRANSFERRED RECORDS (OUTPATIENT)
Dept: HEALTH INFORMATION MANAGEMENT | Facility: CLINIC | Age: 40
End: 2020-07-07

## 2020-07-10 ENCOUNTER — TRANSFERRED RECORDS (OUTPATIENT)
Dept: HEALTH INFORMATION MANAGEMENT | Facility: CLINIC | Age: 40
End: 2020-07-10

## 2020-07-21 DIAGNOSIS — K21.9 GASTROESOPHAGEAL REFLUX DISEASE WITHOUT ESOPHAGITIS: ICD-10-CM

## 2020-07-22 NOTE — TELEPHONE ENCOUNTER
Prescription approved per [facility] Refill Protocol.  Miranda Crooks RN, BSN

## 2020-08-07 ENCOUNTER — TRANSFERRED RECORDS (OUTPATIENT)
Dept: HEALTH INFORMATION MANAGEMENT | Facility: CLINIC | Age: 40
End: 2020-08-07

## 2020-11-14 ENCOUNTER — HEALTH MAINTENANCE LETTER (OUTPATIENT)
Age: 40
End: 2020-11-14

## 2020-11-23 ENCOUNTER — MYC REFILL (OUTPATIENT)
Dept: PEDIATRICS | Facility: CLINIC | Age: 40
End: 2020-11-23

## 2020-11-23 DIAGNOSIS — K21.9 GASTROESOPHAGEAL REFLUX DISEASE WITHOUT ESOPHAGITIS: ICD-10-CM

## 2020-11-23 DIAGNOSIS — F41.1 GAD (GENERALIZED ANXIETY DISORDER): ICD-10-CM

## 2020-11-24 RX ORDER — ESCITALOPRAM OXALATE 10 MG/1
10 TABLET ORAL DAILY
Qty: 90 TABLET | Refills: 3 | Status: SHIPPED | OUTPATIENT
Start: 2020-11-24 | End: 2022-07-20

## 2020-11-24 NOTE — TELEPHONE ENCOUNTER
Routing refill request to provider for review/approval because:  Drug interaction warning  iMranda Crooks RN, BSN

## 2020-11-24 NOTE — TELEPHONE ENCOUNTER
Prescription approved per [facility] Refill Protocol  Miranda Crooks RN, BSN

## 2021-02-01 ENCOUNTER — TELEPHONE (OUTPATIENT)
Dept: PEDIATRICS | Facility: CLINIC | Age: 41
End: 2021-02-01

## 2021-02-01 DIAGNOSIS — R42 VERTIGO: Primary | ICD-10-CM

## 2021-02-01 NOTE — TELEPHONE ENCOUNTER
[facility]. He would prefer to see only Dr Monroe. He is also wanting to see if he should just be referred to a specialist for his dizziness? Please advise.

## 2021-02-01 NOTE — TELEPHONE ENCOUNTER
Dr. Monroe,    Please see message and advise    Call placed to pt to get more information    Pt reports that he continues to have dizzy spells once weekly for about a year.  Vertigo when he wakes up associated with nausea  He notices that he has clear drainage on his pillow case when he wakes up and feels a sloshing sound and feeling in his ears.    Pt has seen an ENT and neuro- no answers or treatments that have worked  Advised to pt to try meclizine and hydration     Pt would like his PCP to review the notes from neuro and ENT- notes in chart  Pt is unsure where to go at this point  Pt would like to see his PCP to discuss this further.    YADIEL slots?  Recommendations?    Thank you  Aster Alcala RN on 2/1/2021 at 3:36 PM

## 2021-02-02 NOTE — TELEPHONE ENCOUNTER
Please call [facility]--Dr Karimi used to be a resource for patients with chronic vertigo/dizziness--but I think he retired.       Which of the ENT staff specialize in vertigo/dizziness?     [facility]: Adult Ear, Nose [facility]roat Clinic (Otolaryngology) - [place] [phone]  [email or web address]

## 2021-02-02 NOTE — TELEPHONE ENCOUNTER
Called [facility].   Dr. Mancuso, Dr. Babb, Dr. Carter & Dr. Nissen are specific Providers who see persons for chronic vertigo/dizziness.     Left detailed voicemail with information. Advised he call to set up the appointment.

## 2021-02-03 ENCOUNTER — TELEPHONE (OUTPATIENT)
Dept: OTOLARYNGOLOGY | Facility: CLINIC | Age: 41
End: 2021-02-03
Payer: COMMERCIAL

## 2021-02-03 ENCOUNTER — TELEPHONE (OUTPATIENT)
Dept: OTOLARYNGOLOGY | Facility: CLINIC | Age: 41
End: 2021-02-03

## 2021-02-03 NOTE — TELEPHONE ENCOUNTER
[facility] Call Center    Phone Message    May a detailed message be left on voicemail: no     Reason for Call: Appointment Intake    Referring Provider Name: Tray Monroe MD in [facility] IM[facility]PEDS  Diagnosis and/or Symptoms: Vertigo    Action Taken: Message routed to:  [facility] CLINIC COORDINATORS-EYE-DENTAL-ENT- [[id number]]    Travel Screening: Not Applicable     See 2/1/2021 mychart encounter for more info as well

## 2021-02-05 ENCOUNTER — TRANSFERRED RECORDS (OUTPATIENT)
Dept: HEALTH INFORMATION MANAGEMENT | Facility: CLINIC | Age: 41
End: 2021-02-05

## 2021-02-09 ENCOUNTER — TRANSFERRED RECORDS (OUTPATIENT)
Dept: HEALTH INFORMATION MANAGEMENT | Facility: CLINIC | Age: 41
End: 2021-02-09

## 2021-02-17 NOTE — TELEPHONE ENCOUNTER
FUTURE VISIT INFORMATION      FUTURE VISIT INFORMATION:    Date: 4/20/2021    Time: 2PM    Location: [facility]  REFERRAL INFORMATION:    Referring provider:  Tray Monroe MD    Referring providers clinic:  [facility]     Reason for visit/diagnosis  Vertigo- Referred by Tray Monroe MD in [facility] IM[facility]PEDS    RECORDS REQUESTED FROM:       Clinic name Comments Records Status Imaging Status   [facility]  2/1/2021 referral from Tray Monroe MD     [facility]  7/10/2020 note from [facility]    Audiogram req 2/17/21 - received 2/19/21 7/7/2020 audiogram (poor quality)    Scanned in [facility]     Imaging 4/12/2016 MR Brain  Epic PACS                       2/17/2021 10:28AM sent a fax to [facility] for audiogram - Amay   2/19/2021 11:42AM received audiogram from [place] ent, sent to urgent scanning. Delayed message sent to audiology to review - Amay

## 2021-02-17 NOTE — TELEPHONE ENCOUNTER
1. Have you noticed any changes in hearing? Yes  2. Do you have ringing, buzzing, or other sounds in your ears or head, this is also referred to as Tinnitus? No  3. When and where was your last hearing test? [facility] 7/2020  4. Do you feel lightheaded or foggy? No  5. Do you have a spinning sensation? Yes  6. Is there any specific position that can bring on dizziness? Laying down   7. Does looking up cause dizziness? Yes  8. Does getting in and our of bed cause dizziness? Yes  9. Does turning over in bed increase or cause dizziness? Yes  10. Does bending over cause dizziness? Sometimes: not as severe as laying down   11. Is there anything that you can do to prevent the dizziness? Meds, eating less salty food, less caffeine and alcohol  12. Has the dizziness gotten better with time? No  13. Have you seen Physical Therapy for dizziness? (Please indicate clinic and as much of the location as possible): No  14. Are you being referred to a specific physician? No  15. Have you been evaluated/treated for your dizziness at any other location?  (If yes,obtian as much clinic/provider/locaiton as possible) Yes. (If yes answer the following questions:)   Have you seen any ENT, Neurology, or other providers for these symptoms?             Yes, If yes, where? [facility], [facility]- [facility] if yes, who?    Have you had any balance or Audiology testing? No Have you had an MRI or CT scan of your head or neck? Yes, If yes, where? 2016- [facility] 3/1/21- [facility] upcoming appt  if yes, who?     Would you like to receive your Release of Information by mail or e-mail?  e-mail

## 2021-02-19 DIAGNOSIS — R42 DIZZINESS: Primary | ICD-10-CM

## 2021-02-19 NOTE — TELEPHONE ENCOUNTER
Requesting orders for hearing test, VNG and rotational chair testing prior to patient's appointment with Dr. Nissen on 4/20/2021.      -Per [facility] note: Patient reports concerns for intermittent imbalance and an episode of vertigo which occurred approximately march 2020 with associated nausea. No associated hearing loss with episode.  Episode occurred at night and woke him up. Dizziness was constant for 10 days.  Dizziness was improving as of 7/10/2020.    -Patient's most recent audio performed on 7/7/2020 at [facility] revealed normal hearing bilaterally.  Patient also consulted with ENT at [facility] who recommended a VNG. 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 Vestibular physical therapy was offered but patient declined. Suspected vestibular neuritis occurred in March.    -Patient has seen neurology ([facility]) regarding headaches.      -Most recent MRI performed 4/12/2016 was unremarkable.    Sharda Benton CCC-A  Licensed Audiologist   [place] #[id number]

## 2021-03-02 ENCOUNTER — TELEPHONE (OUTPATIENT)
Dept: AUDIOLOGY | Facility: CLINIC | Age: 41
End: 2021-03-02

## 2021-03-04 ENCOUNTER — VIRTUAL VISIT (OUTPATIENT)
Dept: PEDIATRICS | Facility: CLINIC | Age: 41
End: 2021-03-04
Payer: COMMERCIAL

## 2021-03-04 DIAGNOSIS — G47.01 INSOMNIA DUE TO MEDICAL CONDITION: ICD-10-CM

## 2021-03-04 DIAGNOSIS — R03.0 ELEVATED BLOOD PRESSURE READING WITHOUT DIAGNOSIS OF HYPERTENSION: ICD-10-CM

## 2021-03-04 DIAGNOSIS — R42 VERTIGO: Primary | ICD-10-CM

## 2021-03-04 DIAGNOSIS — F41.1 GAD (GENERALIZED ANXIETY DISORDER): ICD-10-CM

## 2021-03-04 PROCEDURE — 96127 BRIEF EMOTIONAL/BEHAV ASSMT: CPT | Performed by: INTERNAL MEDICINE

## 2021-03-04 PROCEDURE — 99214 OFFICE O/P EST MOD 30 MIN: CPT | Mod: 95 | Performed by: INTERNAL MEDICINE

## 2021-03-04 RX ORDER — LORAZEPAM 0.5 MG/1
0.5 TABLET ORAL
Qty: 20 TABLET | Refills: 0 | Status: SHIPPED | OUTPATIENT
Start: 2021-03-04 | End: 2022-07-18

## 2021-03-04 ASSESSMENT — ANXIETY QUESTIONNAIRES
7. FEELING AFRAID AS IF SOMETHING AWFUL MIGHT HAPPEN: NOT AT ALL
GAD7 TOTAL SCORE: 6
GAD7 TOTAL SCORE: 6
3. WORRYING TOO MUCH ABOUT DIFFERENT THINGS: NOT AT ALL
2. NOT BEING ABLE TO STOP OR CONTROL WORRYING: NOT AT ALL
1. FEELING NERVOUS, ANXIOUS, OR ON EDGE: MORE THAN HALF THE DAYS
4. TROUBLE RELAXING: MORE THAN HALF THE DAYS
GAD7 TOTAL SCORE: 6
6. BECOMING EASILY ANNOYED OR IRRITABLE: MORE THAN HALF THE DAYS
5. BEING SO RESTLESS THAT IT IS HARD TO SIT STILL: NOT AT ALL
7. FEELING AFRAID AS IF SOMETHING AWFUL MIGHT HAPPEN: NOT AT ALL

## 2021-03-04 NOTE — PROGRESS NOTES
"Alexis is a 40 year old who is being evaluated via a billable video visit.      How would you like to obtain your AVS? MyChart  If the video visit is dropped, the invitation should be resent by: Text to cell phone: [phone]  Will anyone else be joining your video visit? No      Video Start Time: 10:30AM    Assessment & Plan     Vertigo  Recurrent episodic vertigo with recent changes in hearing acuity, prior ENT evaluation including abnormal VNG/ sx for almost 1 year.  Suspect Ménière's disease.  Did offer trial of meclizine/patient states this was not helpful.  Currently scheduled to meet with ENT at the [facility] in late April but is interested in seeing ENT sooner.  given referral to [facility].  - OTOLARYNGOLOGY REFERRAL    Insomnia due to medical condition  Insomnia/poor sleep secondary to episodic moderate to severe vertigo.  Start trial of lorazepam short-term.  Side effects reviewed.    LONNY (generalized anxiety disorder)  Continue Lexapro.  - LORazepam (ATIVAN) 0.5 MG tablet; Take 1 tablet (0.5 mg) by mouth nightly as needed for anxiety or sleep    Elevated blood pressure reading without diagnosis of hypertension  Recommended patient purchase home blood pressure cuff and start monitoring home blood pressures and will review readings at our next follow-up in the next 2-3 weeks.      Return in about 2 weeks (around 3/18/2021).    Tray Monore MD, MD  [facility]    Elsi Espinoza is a 40 year old who presents for the following health issues:     HPI     Dizziness  Onset/Duration: almost 1 year. Happens in the middle of the night. Waking up and the room is spinning. Laying down and feeling that his eyes are \"spinning\".    Prior ENT evaluation in 2020 included VNG which was abnormal.  Suspected vestibular neuronitis at the time.  Patient describes recurrent episodes of moderate to severe vertigo.  Symptoms may last for several days when they occur and disrupt sleep.  Notes ongoing poor " sleep-dated started trial of trazodone which was not helpful.  States that several days of poor sleep in a row becoming increasingly difficult and tends to increase anxiety.   Follows up with neurology with regard to chronic migraines.  With the recurrence of episodic vertigo did follow-up in neurology recently and reports a recent repeat MRI in the past week which was reportedly normal.   Additional concerns today regarding elevated blood pressure recently noted at neurology follow-up visit: 150/90.  No prior history.       Description:   Do you feel faint: no  Does it feel like the surroundings (bed, room) are moving: YES  Unsteady/off balance: YES  Have you passed out or fallen: no  Intensity: moderate  Progression of Symptoms: intermittent  Accompanying Signs & Symptoms:  Heart palpitations or chest pain: no  Nausea, vomiting: YES  Weakness or lack of coordination in arms or legs: no  Vision or speech changes: no  Numbness or tingling: no  Ringing in ears (Tinnitus): no  Hearing Loss: YES- ENT test came back normal. Not recently.   History:   Head trauma/concussion history: no  Previous similar symptoms: no  Recent bleeding history: no  Any new medications (BP?): no  Precipitating factors:   Worse with activity: no  Worse with head movement: YES  Alleviating factors:   Does staying in a fixed position give relief: YES- laying down on LEFT side. Right side/back is worse.   Therapies tried and outcome: None    Patient Active Problem List   Diagnosis     Seborrhea     HYPERLIPIDEMIA LDL GOAL <160     Lumbago     Prediabetes     Obesity     LONNY (generalized anxiety disorder)     Chronic gout without tophus, unspecified cause, unspecified site     Current Outpatient Medications   Medication Sig Dispense Refill             allopurinol (ZYLOPRIM) 300 MG tablet Take 1 tablet (300 mg) by mouth daily 90 tablet 3     escitalopram (LEXAPRO) 10 MG tablet Take 1 tablet (10 mg) by mouth daily 90 tablet 3     mometasone  (ELOCON) 0.1 % external ointment Apply sparingly to affected area. 30 g 3     omeprazole (PRILOSEC) 20 MG DR capsule TAKE 1 CAPSULE(20 MG) BY MOUTH DAILY AS NEEDED 90 capsule 1     SUMAtriptan (IMITREX) 50 MG tablet Take 1 tablet (50 mg) by mouth at onset of headache for migraine May repeat dose in 2 hours.  Do not exceed 200 mg in 24 hours 18 tablet 6     traZODone (DESYREL) 50 MG tablet Take 0.5-1 tablets (25-50 mg) by mouth nightly as needed for sleep 30 tablet 1      Review of Systems   Constitutional, HEENT, cardiovascular, pulmonary, gi and gu systems are negative, except as otherwise noted.      Objective         Vitals:  No vitals were obtained today due to virtual visit.    Physical Exam   GENERAL: Healthy, alert and no distress  EYES: Eyes grossly normal to inspection.  No discharge or erythema, or obvious scleral/conjunctival abnormalities.  RESP: No audible wheeze, cough, or visible cyanosis.  No visible retractions or increased work of breathing.    SKIN: Visible skin clear. No significant rash, abnormal pigmentation or lesions.  NEURO: Cranial nerves grossly intact.  Mentation and speech appropriate for age.  PSYCH: Mentation appears normal, affect normal/bright, judgement and insight intact, normal speech and appearance well-groomed.          Video-Visit Details    Type of service:  Video Visit    Video End Time:10:55 AM    Originating Location (pt. Location): Home    Distant Location (provider location):  [facility]     Platform used for Video Visit: [employer] [place]    Answers for HPI/ROS submitted by the patient on 3/4/2021   Chronic problems general questions HPI Form  LONNY 7 TOTAL SCORE: 6

## 2021-03-05 ASSESSMENT — ANXIETY QUESTIONNAIRES: GAD7 TOTAL SCORE: 6

## 2021-03-19 ENCOUNTER — TELEPHONE (OUTPATIENT)
Dept: AUDIOLOGY | Facility: CLINIC | Age: 41
End: 2021-03-19

## 2021-03-19 NOTE — TELEPHONE ENCOUNTER
Attempted to contact pt to remind him of upcoming BT appt on 3/26.    Call went to  and  was full.      BALANCE TESTING  You are scheduled for 1 or more of the following tests:  VNG (Videonystagmography). You will wear goggles with small cameras. These record small eye movements as you change position. This test takes 1 to 1 1/2 hours.     Rotational Chair. You will sit a chair that has a motor. The room will be dark. You will wear goggles with a camera while the chair rocks slowly from side to side. This test takes 20 to 30 minutes.     CDP (Computerized Dynamic Posturography). You will stand on a platform with your eyes open or closed. It will be unsteady at times. This will tell us how your balance systems work together and how well you sense the ground under your feet. This test takes 30 minutes.     Why am I having these tests?  These are tests for your inner ear. They may help us find out why you feel dizzy or off balance.     How do I prepare?  Below is a list of things that can affect test results. Do NOT take these for 48 hours before your tests or we will need to reschedule. We want to make sure your test results are correct. Ask your doctor if you have questions about stopping any medicine.     48 hours before the tests:  Stop drinking alcohol (beer, wine, liquor).  Do NOT take Amitriptyline.  Do NOT take medicines for:  Allergy or colds. Examples: Benadryl (diphenhydramine), Allegra (fexofenadine), Zyrtec (cetirizine) and any over-the-counter  medicine that may cause drowsiness.  Anti-anxiety, unless you have been on them every day for the past 8 weeks or more. Examples: Xanax (alprazolam), Klonopin  (clonazepam), Valium (diazepam), Ativan (lorazepam).  Dizziness and nausea. Examples: Antivert/Bonine/Dramamine Less-Drowsy Formula (meclizine), Dramamine (dimenhydrinate),  Trans-derm Scop (Scopolamine), Compazine (prochlorperazine), Phenergan (promethazine).  Sleeping. This includes sleeping pills,  sedatives, tranquilizers, muscle relaxants and narcotics.  It is okay to take medicines for diabetes, heart, blood pressure, seizures, thyroid or an ongoing condition.     The day of the tests:    Please have a  with you.    Do not have caffeine (coffee, soda, chocolate, energy drinks).    Do not smoke or have nicotine for at least 4 hours before the tests. This includes tobacco, e-cigarettes and nicotine gum.    Do not eat 2 to 3 hours before the tests, unless you have diabetes. Then, you should follow your usual diet.    Do not wear any make-up or lotions around your eyes or eyelids.    If you wear contact lenses, be prepared to take them out for testing; or wear your glasses.    If you take the anti-nausea medicine Zofran (ondansetron), do not take it on the day of your test. You may bring it with you to take after your tests.     Who should I call if I have questions?  If you have any questions, please call the [facility] at [facility]:  [phone]

## 2021-03-25 ENCOUNTER — VIRTUAL VISIT (OUTPATIENT)
Dept: PEDIATRICS | Facility: CLINIC | Age: 41
End: 2021-03-25
Payer: COMMERCIAL

## 2021-03-25 DIAGNOSIS — G43.809 VESTIBULAR MIGRAINE: Primary | ICD-10-CM

## 2021-03-25 DIAGNOSIS — F41.1 GAD (GENERALIZED ANXIETY DISORDER): ICD-10-CM

## 2021-03-25 DIAGNOSIS — R42 VERTIGO: ICD-10-CM

## 2021-03-25 PROCEDURE — 99214 OFFICE O/P EST MOD 30 MIN: CPT | Mod: 95 | Performed by: INTERNAL MEDICINE

## 2021-03-25 ASSESSMENT — ANXIETY QUESTIONNAIRES
GAD7 TOTAL SCORE: 4
4. TROUBLE RELAXING: SEVERAL DAYS
7. FEELING AFRAID AS IF SOMETHING AWFUL MIGHT HAPPEN: NOT AT ALL
3. WORRYING TOO MUCH ABOUT DIFFERENT THINGS: SEVERAL DAYS
7. FEELING AFRAID AS IF SOMETHING AWFUL MIGHT HAPPEN: NOT AT ALL
2. NOT BEING ABLE TO STOP OR CONTROL WORRYING: NOT AT ALL
5. BEING SO RESTLESS THAT IT IS HARD TO SIT STILL: NOT AT ALL
6. BECOMING EASILY ANNOYED OR IRRITABLE: SEVERAL DAYS
1. FEELING NERVOUS, ANXIOUS, OR ON EDGE: SEVERAL DAYS
GAD7 TOTAL SCORE: 4
GAD7 TOTAL SCORE: 4

## 2021-03-25 NOTE — PROGRESS NOTES
Alexis is a 40 year old who is being evaluated via a billable video visit.      How would you like to obtain your AVS? MyChart  If the video visit is dropped, the invitation should be resent by: Text to cell phone: [phone]  Will anyone else be joining your video visit? No      Video Start Time: 10:27 AM    Assessment & Plan       ICD-10-CM    1. Vestibular migraine  G43.809  vestibular migraine, follow-up.  Starting physical therapy.  Following up with neurology-may be starting once monthly injections, records pending.     2. Vertigo  R42  episodic vertigo associated with vestibular migraine and likely underlying Ménière's disease     3. LONNY (generalized anxiety disorder)  F41.1  recent weight gain likely secondary to Lexapro.  Patient would like to try taper off Lexapro.  Recommended reducing dose from 10 to 5 mg daily, may use lorazepam as needed for breakthrough anxiety during taper.  Continue 5 mg dose for 4 weeks and if well-tolerated reduce to 2.5 mg daily.   If anxiety symptoms increase significantly likely add buspirone     Tray Monroe MD  [facility]   Alexis is a 40 year old who presents for the following health issues     HPI     Alexis presents today for follow-up of dizziness, elevated blood pressure, weight gain and anxiety.  Most recent visit on 3/4.  At the time patient had concerns regarding almost a year of episodic dizziness -vertigo.  Prior evaluation by ENT including VNG which was abnormal.  Has continued to have bouts of moderate to severe vertigo and ongoing poor sleep.   Ménière's disease was suspected and ENT follow-up recommended.  At this time patient has not met with ENT but has an upcoming visit in April.        Alexis did meet with neurology for additional testing.  Reports that he underwent MRI brain/Cspine, EMG as well as lab work including thyroid studies and vitamin B12--all normal.  Neurology ultimately diagnosed likely vestibular migraine with  underlying Ménière's disease also suspected.  Neurology referred Alexis for physical therapy to manage neck tension and tightness associated with migraines.  He has been through a few visits at this point with some improvement.      Additional concerns today with regarding weight gain.  Patient notes roughly 20-25 pound weight gain over the past year or so and seems to coincide with starting Lexapro for anxiety.  Currently takes Lexapro 10 mg daily.  Notes that his diet has not changed all that significantly but he has gradually gained weight. He has tried to manage his diet and increase exercise, but is having more more difficulty losing the extra weight.    Anxiety  Medication Followup of ativan    Taking Medication as prescribed: yes    Side Effects:  None    Medication Helping Symptoms:     Answers for HPI/ROS submitted by the patient on 3/25/2021   Chronic problems general questions HPI Form  LONNY 7 TOTAL SCORE: 4    Patient Active Problem List   Diagnosis     Seborrhea     HYPERLIPIDEMIA LDL GOAL <160     Lumbago     Prediabetes     Obesity     LONNY (generalized anxiety disorder)     Chronic gout without tophus, unspecified cause, unspecified site     Current Outpatient Medications   Medication Sig Dispense Refill     allopurinol (ZYLOPRIM) 300 MG tablet Take 1 tablet (300 mg) by mouth daily 90 tablet 3     escitalopram (LEXAPRO) 10 MG tablet Take 1 tablet (10 mg) by mouth daily 90 tablet 3     LORazepam (ATIVAN) 0.5 MG tablet Take 1 tablet (0.5 mg) by mouth nightly as needed for anxiety or sleep 20 tablet 0     mometasone (ELOCON) 0.1 % external ointment Apply sparingly to affected area. 30 g 3     omeprazole (PRILOSEC) 20 MG DR capsule TAKE 1 CAPSULE(20 MG) BY MOUTH DAILY AS NEEDED 90 capsule 1     SUMAtriptan (IMITREX) 50 MG tablet Take 1 tablet (50 mg) by mouth at onset of headache for migraine May repeat dose in 2 hours.  Do not exceed 200 mg in 24 hours 18 tablet 6     traZODone (DESYREL) 50 MG tablet Take  0.5-1 tablets (25-50 mg) by mouth nightly as needed for sleep 30 tablet 1        Review of Systems   Constitutional, HEENT, cardiovascular, pulmonary, gi and gu systems are negative, except as otherwise noted.      Objective           Vitals:  No vitals were obtained today due to virtual visit.    Physical Exam   GENERAL: Healthy, alert and no distress  EYES: Eyes grossly normal to inspection.  No discharge or erythema, or obvious scleral/conjunctival abnormalities.  RESP: No audible wheeze, cough, or visible cyanosis.  No visible retractions or increased work of breathing.    SKIN: Visible skin clear. No significant rash, abnormal pigmentation or lesions.  NEURO: Cranial nerves grossly intact.  Mentation and speech appropriate for age.  PSYCH: Mentation appears normal, affect normal/bright, judgement and insight intact, normal speech and appearance well-groomed.          Video-Visit Details    Type of service:  Video Visit    Video End Time:10:44 AM    Originating Location (pt. Location): Home    Distant Location (provider location):  [facility]     Platform used for Video Visit: [employer]

## 2021-03-25 NOTE — PATIENT INSTRUCTIONS
Alexis thank you for your time today.  Here's a summary of our virtual visit and the plan we discussed.   Please let us know if you have any additional questions:    Reduce lexpro to 5mg daily for the next 4 weeks.   email an update in 2 weeks  Ok to use lorazepam as needed for more severe anxiety symptoms.    At 4 weeks if you are doing well on the 5mg dose we can reduce the dose to 2.5mg daily  Tapering off lexapro should help as you work on losing weight    Have a good day!  Dr Monroe

## 2021-03-26 ASSESSMENT — ANXIETY QUESTIONNAIRES: GAD7 TOTAL SCORE: 4

## 2021-04-14 ENCOUNTER — TRANSFERRED RECORDS (OUTPATIENT)
Dept: HEALTH INFORMATION MANAGEMENT | Facility: CLINIC | Age: 41
End: 2021-04-14

## 2021-04-20 ENCOUNTER — PRE VISIT (OUTPATIENT)
Dept: OTOLARYNGOLOGY | Facility: CLINIC | Age: 41
End: 2021-04-20

## 2021-04-28 ENCOUNTER — TRANSFERRED RECORDS (OUTPATIENT)
Dept: HEALTH INFORMATION MANAGEMENT | Facility: CLINIC | Age: 41
End: 2021-04-28

## 2021-06-01 ENCOUNTER — TELEPHONE (OUTPATIENT)
Dept: AUDIOLOGY | Facility: CLINIC | Age: 41
End: 2021-06-01

## 2021-06-01 NOTE — TELEPHONE ENCOUNTER
"\"I m calling from the Audiology and Balance Testing department at the . This is just a call to remind you of your upcoming Balance Testing appointment on [Date], and to see if you have any questions or concerns regarding the balance testing you'll be doing. You should have received an itinerary via mail or via [employer], if you are active, that goes over what to expect and explains the dos and don ts both 48 hours before, and the day of. There is a list of medications for you to review on the itinerary that we would like you to stop taking beforehand. If you didn t receive the itinerary or you still have questions, please give our clinic a call at [phone]. Otherwise, we will see you on [Date] starting at [Time].\"    Please send encounter if patient would like to reschedule.  "

## 2021-09-12 ENCOUNTER — HEALTH MAINTENANCE LETTER (OUTPATIENT)
Age: 41
End: 2021-09-12

## 2021-10-01 ENCOUNTER — MYC MEDICAL ADVICE (OUTPATIENT)
Dept: PEDIATRICS | Facility: CLINIC | Age: 41
End: 2021-10-01

## 2021-10-01 DIAGNOSIS — M1A.9XX0 CHRONIC GOUT WITHOUT TOPHUS, UNSPECIFIED CAUSE, UNSPECIFIED SITE: ICD-10-CM

## 2021-10-01 RX ORDER — ALLOPURINOL 300 MG/1
300 TABLET ORAL DAILY
Qty: 90 TABLET | Refills: 0 | Status: SHIPPED | OUTPATIENT
Start: 2021-10-01 | End: 2021-10-01

## 2021-10-01 RX ORDER — ALLOPURINOL 300 MG/1
300 TABLET ORAL DAILY
Qty: 90 TABLET | Refills: 3 | Status: SHIPPED | OUTPATIENT
Start: 2021-10-01 | End: 2022-09-08

## 2021-10-01 NOTE — TELEPHONE ENCOUNTER
Ok for 1 yr refill, sent  Please have pt rtc for labwork at his convenience  In the next few months

## 2021-10-08 NOTE — TELEPHONE ENCOUNTER
Patient read [employer] message. No appointment made. Called patient - no answer. Lvm to call the clinic to set up an appointment. Closing encounter.     Radha Chi MA

## 2022-01-02 ENCOUNTER — HEALTH MAINTENANCE LETTER (OUTPATIENT)
Age: 42
End: 2022-01-02

## 2022-02-28 DIAGNOSIS — K21.9 GASTROESOPHAGEAL REFLUX DISEASE WITHOUT ESOPHAGITIS: ICD-10-CM

## 2022-03-02 NOTE — TELEPHONE ENCOUNTER
Medication is being filled for 1 time refill only due to:  Patient needs to be seen because he is due to be seen for his yearly office visit.  Last appointment 3/25/2021 (Virtual Visit). Please contact patient and schedule appointment for further refills.     Alix Resendez RN

## 2022-03-06 DIAGNOSIS — K21.9 GASTROESOPHAGEAL REFLUX DISEASE WITHOUT ESOPHAGITIS: ICD-10-CM

## 2022-03-10 NOTE — TELEPHONE ENCOUNTER
2nd attempt at contacting patient, sent [employer] message.     Trista Castillo, CMA

## 2022-03-21 NOTE — TELEPHONE ENCOUNTER
Patient read [employer] message - no appointment made at this time. Called patient - no answer. Praveenm.     Radha Chi MA

## 2022-06-07 ENCOUNTER — MYC REFILL (OUTPATIENT)
Dept: PEDIATRICS | Facility: CLINIC | Age: 42
End: 2022-06-07
Payer: COMMERCIAL

## 2022-06-07 DIAGNOSIS — K21.9 GASTROESOPHAGEAL REFLUX DISEASE WITHOUT ESOPHAGITIS: ICD-10-CM

## 2022-06-08 NOTE — TELEPHONE ENCOUNTER
Routing refill request to provider for review/approval because:  Patient needs to be seen because it has been more than 1 year since last office visit.  Last in person visit:  9/18/2019  Last E-Visit:  12/18/20  Last Virtual visit:  3/25/2021    Alix Resendez RN

## 2022-06-09 NOTE — TELEPHONE ENCOUNTER
1st attempt at contacting patient, sent [employer]t message.     Trista Castillo, CMA

## 2022-06-16 NOTE — TELEPHONE ENCOUNTER
Patient read [employer] message that was sent on 6/9/2022 - no appointment set up at this time. Called patient - no answer. Praveenm.     Radha Chi MA

## 2022-07-17 ENCOUNTER — E-VISIT (OUTPATIENT)
Dept: PEDIATRICS | Facility: CLINIC | Age: 42
End: 2022-07-17
Payer: COMMERCIAL

## 2022-07-17 DIAGNOSIS — F41.1 GAD (GENERALIZED ANXIETY DISORDER): Primary | ICD-10-CM

## 2022-07-17 PROCEDURE — 99422 OL DIG E/M SVC 11-20 MIN: CPT | Performed by: INTERNAL MEDICINE

## 2022-07-17 ASSESSMENT — ANXIETY QUESTIONNAIRES
5. BEING SO RESTLESS THAT IT IS HARD TO SIT STILL: SEVERAL DAYS
6. BECOMING EASILY ANNOYED OR IRRITABLE: NEARLY EVERY DAY
7. FEELING AFRAID AS IF SOMETHING AWFUL MIGHT HAPPEN: NOT AT ALL
2. NOT BEING ABLE TO STOP OR CONTROL WORRYING: SEVERAL DAYS
4. TROUBLE RELAXING: MORE THAN HALF THE DAYS
GAD7 TOTAL SCORE: 10
GAD7 TOTAL SCORE: 10
3. WORRYING TOO MUCH ABOUT DIFFERENT THINGS: SEVERAL DAYS
1. FEELING NERVOUS, ANXIOUS, OR ON EDGE: MORE THAN HALF THE DAYS
7. FEELING AFRAID AS IF SOMETHING AWFUL MIGHT HAPPEN: NOT AT ALL
8. IF YOU CHECKED OFF ANY PROBLEMS, HOW DIFFICULT HAVE THESE MADE IT FOR YOU TO DO YOUR WORK, TAKE CARE OF THINGS AT HOME, OR GET ALONG WITH OTHER PEOPLE?: VERY DIFFICULT
GAD7 TOTAL SCORE: 10

## 2022-07-17 ASSESSMENT — PATIENT HEALTH QUESTIONNAIRE - PHQ9
10. IF YOU CHECKED OFF ANY PROBLEMS, HOW DIFFICULT HAVE THESE PROBLEMS MADE IT FOR YOU TO DO YOUR WORK, TAKE CARE OF THINGS AT HOME, OR GET ALONG WITH OTHER PEOPLE: SOMEWHAT DIFFICULT
SUM OF ALL RESPONSES TO PHQ QUESTIONS 1-9: 7
SUM OF ALL RESPONSES TO PHQ QUESTIONS 1-9: 7

## 2022-07-18 ASSESSMENT — PATIENT HEALTH QUESTIONNAIRE - PHQ9: SUM OF ALL RESPONSES TO PHQ QUESTIONS 1-9: 7

## 2022-07-18 ASSESSMENT — ANXIETY QUESTIONNAIRES: GAD7 TOTAL SCORE: 10

## 2022-07-20 RX ORDER — ESCITALOPRAM OXALATE 10 MG/1
10 TABLET ORAL DAILY
Qty: 90 TABLET | Refills: 3 | Status: SHIPPED | OUTPATIENT
Start: 2022-07-20 | End: 2023-09-11

## 2022-07-21 NOTE — PATIENT INSTRUCTIONS
Depression: Tips to Help Yourself    As your healthcare providers help treat your depression, you can also help yourself. Keep in mind that your illness affects you emotionally, physically, mentally, and socially. So full recovery will take time. Take care of your body and your soul, and be patient with yourself as you get better.  Self-care    Educate yourself. Read about treatment and medicine options. If you have the energy, attend local conferences or support groups. Keep a list of useful websites and helpful books and use them as needed. This illness is not your fault. Don t blame yourself for your depression.    Manage early symptoms. If you notice symptoms returning, experience triggers, or identify other factors that may lead to a depressive episode, get help as soon as possible. Ask trusted friends and family to monitor your behavior and let you know if they see anything of concern.    Work with your provider. Find a provider you can trust. Communicate honestly with that person and share information on your treatment for depression and your reaction to medicines.    Be prepared for a crisis. Know what to do if you experience a crisis. Keep the phone number of a crisis hotline and know the location of your community's urgent care centers and the closest emergency department.    Hold off on big decisions. Depression can cloud your judgment. So wait until you feel better before making major life decisions, such as changing jobs, moving, or getting  or .    Be patient. Recovering from depression is a process. Don t be discouraged if it takes some time to feel better.    Keep it simple. Depression saps your energy and concentration. So you won t be able to do all the things you used to do. Set small goals and do what you can.    Be with others. Don t isolate yourself--you ll only feel worse. Try to be with other people. And take part in fun activities when you can. Go to a movie, ballgame,  [employer] service, or social event. Talk openly with people you can trust. And accept help when it s offered.    Take care of your body  People with depression often lose the desire to take care of themselves. That only makes their problems worse. During treatment and afterward, make a point to:    Exercise. It s a great way to take care of your body. And studies have shown that exercise helps fight depression. Aim for 30 minutes of moderate activity a day. Walking in small blocks of time (5-10 minutes) is a good way to start, but anything that gets you moving (gardening, house cleaning) counts.    Don't use drugs and alcohol. These may ease the pain in the short term. But they ll only make your problems worse in the long run.    Get relief from stress. Ask your healthcare provider for relaxation exercises and techniques to help relieve stress. Consider activities like meditation, yoga, or Jaime Chi.    Eat right. A balanced and healthy diet helps keep your body healthy.    Get adequate sleep. Aim for 8 hours per night. Too much or too little sleep can cause other physical and emotional problems.  [employer] last reviewed this educational content on 12/1/2019 2000-2021 [employer]. All rights reserved. This information is not intended as a substitute for professional medical care. Always follow your healthcare professional's instructions.          Depression Affects Your Mind and Body  Everyone feels sad or  blue  from time to time for a few days or weeks. Depression is when these feelings don't go away and they interfere with daily life. Depression is a real illness that can develop at any age. It is one of the most common mental health problems in the [place] Depression makes you feel sad, helpless, and hopeless. It gets in the way of your life and relationships. Depression causes chemical changes in the brain that inhibit your ability to think and act. But, with help, you can feel better again.      Depression affects your whole body  Brain chemicals affect your body as well as your mood. So depression may do more than just make you feel low. You may also feel bad physically. Depression can:     Cause trouble with mental tasks such as remembering, concentrating, or making decisions    Make you feel nervous and jumpy    Cause trouble sleeping. Or you may sleep too much.    Change your appetite    Cause headaches, stomachaches, or other aches and pains    Drain your body of energy  Depression and other illness  It is common for people who have chronic health problems to also have depression. It can often be hard to tell which one caused the other. A person might become depressed after finding out they have a health problem. But some studies suggest being depressed may make certain health problems more likely. And some depressed people stop taking care of themselves. This may make them more likely to get sick.   [employer] last reviewed this educational content on 5/1/2020 2000-2021 [employer]. All rights reserved. This information is not intended as a substitute for professional medical care. Always follow your healthcare professional's instructions.

## 2022-09-04 DIAGNOSIS — K21.9 GASTROESOPHAGEAL REFLUX DISEASE WITHOUT ESOPHAGITIS: ICD-10-CM

## 2022-09-07 SDOH — ECONOMIC STABILITY: INCOME INSECURITY: HOW HARD IS IT FOR YOU TO PAY FOR THE VERY BASICS LIKE FOOD, HOUSING, MEDICAL CARE, AND HEATING?: NOT HARD AT ALL

## 2022-09-07 SDOH — ECONOMIC STABILITY: FOOD INSECURITY: WITHIN THE PAST 12 MONTHS, YOU WORRIED THAT YOUR FOOD WOULD RUN OUT BEFORE YOU GOT MONEY TO BUY MORE.: NEVER TRUE

## 2022-09-07 SDOH — ECONOMIC STABILITY: TRANSPORTATION INSECURITY
IN THE PAST 12 MONTHS, HAS THE LACK OF TRANSPORTATION KEPT YOU FROM MEDICAL APPOINTMENTS OR FROM GETTING MEDICATIONS?: NO

## 2022-09-07 SDOH — HEALTH STABILITY: PHYSICAL HEALTH: ON AVERAGE, HOW MANY MINUTES DO YOU ENGAGE IN EXERCISE AT THIS LEVEL?: 40 MIN

## 2022-09-07 SDOH — ECONOMIC STABILITY: INCOME INSECURITY: IN THE LAST 12 MONTHS, WAS THERE A TIME WHEN YOU WERE NOT ABLE TO PAY THE MORTGAGE OR RENT ON TIME?: NO

## 2022-09-07 SDOH — ECONOMIC STABILITY: TRANSPORTATION INSECURITY
IN THE PAST 12 MONTHS, HAS LACK OF TRANSPORTATION KEPT YOU FROM MEETINGS, WORK, OR FROM GETTING THINGS NEEDED FOR DAILY LIVING?: NO

## 2022-09-07 SDOH — HEALTH STABILITY: PHYSICAL HEALTH: ON AVERAGE, HOW MANY DAYS PER WEEK DO YOU ENGAGE IN MODERATE TO STRENUOUS EXERCISE (LIKE A BRISK WALK)?: 5 DAYS

## 2022-09-07 SDOH — ECONOMIC STABILITY: FOOD INSECURITY: WITHIN THE PAST 12 MONTHS, THE FOOD YOU BOUGHT JUST DIDN'T LAST AND YOU DIDN'T HAVE MONEY TO GET MORE.: NEVER TRUE

## 2022-09-07 ASSESSMENT — SOCIAL DETERMINANTS OF HEALTH (SDOH)
IN A TYPICAL WEEK, HOW MANY TIMES DO YOU TALK ON THE PHONE WITH FAMILY, FRIENDS, OR NEIGHBORS?: MORE THAN THREE TIMES A WEEK
HOW OFTEN DO YOU ATTEND CHURCH OR RELIGIOUS SERVICES?: PATIENT DECLINED
DO YOU BELONG TO ANY CLUBS OR ORGANIZATIONS SUCH AS CHURCH GROUPS UNIONS, FRATERNAL OR ATHLETIC GROUPS, OR SCHOOL GROUPS?: NO
HOW OFTEN DO YOU GET TOGETHER WITH FRIENDS OR RELATIVES?: MORE THAN THREE TIMES A WEEK

## 2022-09-07 ASSESSMENT — ENCOUNTER SYMPTOMS
CHILLS: 0
ARTHRALGIAS: 1
FEVER: 0
CONSTIPATION: 0
HEADACHES: 1
ABDOMINAL PAIN: 0
DIZZINESS: 0
HEMATURIA: 0
HEARTBURN: 0
JOINT SWELLING: 0
COUGH: 0
FREQUENCY: 0
HEMATOCHEZIA: 0
DIARRHEA: 0
EYE PAIN: 0
NERVOUS/ANXIOUS: 1

## 2022-09-07 ASSESSMENT — LIFESTYLE VARIABLES
HOW OFTEN DO YOU HAVE SIX OR MORE DRINKS ON ONE OCCASION: NEVER
HOW MANY STANDARD DRINKS CONTAINING ALCOHOL DO YOU HAVE ON A TYPICAL DAY: 1 OR 2
SKIP TO QUESTIONS 9-10: 1
HOW OFTEN DO YOU HAVE A DRINK CONTAINING ALCOHOL: 2-3 TIMES A WEEK
AUDIT-C TOTAL SCORE: 3

## 2022-09-08 ENCOUNTER — OFFICE VISIT (OUTPATIENT)
Dept: PEDIATRICS | Facility: CLINIC | Age: 42
End: 2022-09-08
Payer: COMMERCIAL

## 2022-09-08 ENCOUNTER — TRANSFERRED RECORDS (OUTPATIENT)
Dept: PEDIATRICS | Facility: CLINIC | Age: 42
End: 2022-09-08

## 2022-09-08 VITALS
WEIGHT: 222.2 LBS | RESPIRATION RATE: 16 BRPM | TEMPERATURE: 97.1 F | BODY MASS INDEX: 31.81 KG/M2 | SYSTOLIC BLOOD PRESSURE: 126 MMHG | HEIGHT: 70 IN | HEART RATE: 70 BPM | DIASTOLIC BLOOD PRESSURE: 87 MMHG | OXYGEN SATURATION: 99 %

## 2022-09-08 DIAGNOSIS — M1A.9XX0 CHRONIC GOUT WITHOUT TOPHUS, UNSPECIFIED CAUSE, UNSPECIFIED SITE: ICD-10-CM

## 2022-09-08 DIAGNOSIS — Z00.00 ROUTINE GENERAL MEDICAL EXAMINATION AT A HEALTH CARE FACILITY: Primary | ICD-10-CM

## 2022-09-08 DIAGNOSIS — E78.5 HYPERLIPIDEMIA LDL GOAL <160: ICD-10-CM

## 2022-09-08 DIAGNOSIS — Z11.59 NEED FOR HEPATITIS C SCREENING TEST: ICD-10-CM

## 2022-09-08 DIAGNOSIS — R73.03 PREDIABETES: ICD-10-CM

## 2022-09-08 DIAGNOSIS — K21.9 GASTROESOPHAGEAL REFLUX DISEASE WITHOUT ESOPHAGITIS: ICD-10-CM

## 2022-09-08 DIAGNOSIS — K42.9 UMBILICAL HERNIA WITHOUT OBSTRUCTION AND WITHOUT GANGRENE: ICD-10-CM

## 2022-09-08 DIAGNOSIS — L40.9 PSORIASIS: ICD-10-CM

## 2022-09-08 DIAGNOSIS — F41.1 GAD (GENERALIZED ANXIETY DISORDER): ICD-10-CM

## 2022-09-08 PROBLEM — F10.20 ALCOHOL DEPENDENCE (H): Status: ACTIVE | Noted: 2022-09-08

## 2022-09-08 LAB
ALBUMIN SERPL-MCNC: 4.4 G/DL (ref 3.4–5)
ALP SERPL-CCNC: 83 U/L (ref 40–150)
ALT SERPL W P-5'-P-CCNC: 48 U/L (ref 0–70)
ANION GAP SERPL CALCULATED.3IONS-SCNC: 5 MMOL/L (ref 3–14)
AST SERPL W P-5'-P-CCNC: 25 U/L (ref 0–45)
BASOPHILS # BLD AUTO: 0 10E3/UL (ref 0–0.2)
BASOPHILS NFR BLD AUTO: 0 %
BILIRUB SERPL-MCNC: 0.8 MG/DL (ref 0.2–1.3)
BUN SERPL-MCNC: 15 MG/DL (ref 7–30)
CALCIUM SERPL-MCNC: 9.9 MG/DL (ref 8.5–10.1)
CHLORIDE BLD-SCNC: 104 MMOL/L (ref 94–109)
CHOLEST SERPL-MCNC: 214 MG/DL
CO2 SERPL-SCNC: 28 MMOL/L (ref 20–32)
CREAT SERPL-MCNC: 0.89 MG/DL (ref 0.66–1.25)
EOSINOPHIL # BLD AUTO: 0.1 10E3/UL (ref 0–0.7)
EOSINOPHIL NFR BLD AUTO: 2 %
ERYTHROCYTE [DISTWIDTH] IN BLOOD BY AUTOMATED COUNT: 13.1 % (ref 10–15)
FASTING STATUS PATIENT QL REPORTED: YES
GFR SERPL CREATININE-BSD FRML MDRD: >90 ML/MIN/1.73M2
GLUCOSE BLD-MCNC: 104 MG/DL (ref 70–99)
HBA1C MFR BLD: 5.3 % (ref 0–5.6)
HCT VFR BLD AUTO: 47.8 % (ref 40–53)
HCV AB SERPL QL IA: NONREACTIVE
HDLC SERPL-MCNC: 34 MG/DL
HGB BLD-MCNC: 16.8 G/DL (ref 13.3–17.7)
LDLC SERPL CALC-MCNC: 112 MG/DL
LDLC SERPL CALC-MCNC: ABNORMAL MG/DL
LYMPHOCYTES # BLD AUTO: 1.9 10E3/UL (ref 0.8–5.3)
LYMPHOCYTES NFR BLD AUTO: 29 %
MCH RBC QN AUTO: 31.6 PG (ref 26.5–33)
MCHC RBC AUTO-ENTMCNC: 35.1 G/DL (ref 31.5–36.5)
MCV RBC AUTO: 90 FL (ref 78–100)
MONOCYTES # BLD AUTO: 0.7 10E3/UL (ref 0–1.3)
MONOCYTES NFR BLD AUTO: 11 %
NEUTROPHILS # BLD AUTO: 3.8 10E3/UL (ref 1.6–8.3)
NEUTROPHILS NFR BLD AUTO: 58 %
NONHDLC SERPL-MCNC: 180 MG/DL
PLATELET # BLD AUTO: 196 10E3/UL (ref 150–450)
POTASSIUM BLD-SCNC: 4.2 MMOL/L (ref 3.4–5.3)
PROT SERPL-MCNC: 7.9 G/DL (ref 6.8–8.8)
RBC # BLD AUTO: 5.31 10E6/UL (ref 4.4–5.9)
SODIUM SERPL-SCNC: 137 MMOL/L (ref 133–144)
TRIGL SERPL-MCNC: 469 MG/DL
URATE SERPL-MCNC: 5.8 MG/DL (ref 3.5–7.2)
WBC # BLD AUTO: 6.6 10E3/UL (ref 4–11)

## 2022-09-08 PROCEDURE — 83036 HEMOGLOBIN GLYCOSYLATED A1C: CPT | Performed by: INTERNAL MEDICINE

## 2022-09-08 PROCEDURE — 36415 COLL VENOUS BLD VENIPUNCTURE: CPT | Performed by: INTERNAL MEDICINE

## 2022-09-08 PROCEDURE — 85025 COMPLETE CBC W/AUTO DIFF WBC: CPT | Performed by: INTERNAL MEDICINE

## 2022-09-08 PROCEDURE — 80053 COMPREHEN METABOLIC PANEL: CPT | Performed by: INTERNAL MEDICINE

## 2022-09-08 PROCEDURE — 99214 OFFICE O/P EST MOD 30 MIN: CPT | Mod: 25 | Performed by: INTERNAL MEDICINE

## 2022-09-08 PROCEDURE — 86803 HEPATITIS C AB TEST: CPT | Performed by: INTERNAL MEDICINE

## 2022-09-08 PROCEDURE — 83721 ASSAY OF BLOOD LIPOPROTEIN: CPT | Mod: 59 | Performed by: INTERNAL MEDICINE

## 2022-09-08 PROCEDURE — 99396 PREV VISIT EST AGE 40-64: CPT | Performed by: INTERNAL MEDICINE

## 2022-09-08 PROCEDURE — 80061 LIPID PANEL: CPT | Performed by: INTERNAL MEDICINE

## 2022-09-08 PROCEDURE — 84550 ASSAY OF BLOOD/URIC ACID: CPT | Performed by: INTERNAL MEDICINE

## 2022-09-08 RX ORDER — MOMETASONE FUROATE 1 MG/G
OINTMENT TOPICAL
Qty: 45 G | Refills: 6 | Status: SHIPPED | OUTPATIENT
Start: 2022-09-08

## 2022-09-08 RX ORDER — ALLOPURINOL 300 MG/1
300 TABLET ORAL DAILY
Qty: 90 TABLET | Refills: 3 | Status: SHIPPED | OUTPATIENT
Start: 2022-09-08 | End: 2023-10-13

## 2022-09-08 ASSESSMENT — ENCOUNTER SYMPTOMS
JOINT SWELLING: 0
HEMATURIA: 0
DIARRHEA: 0
HEMATOCHEZIA: 0
HEADACHES: 1
FREQUENCY: 0
DIZZINESS: 0
FEVER: 0
EYE PAIN: 0
ABDOMINAL PAIN: 0
COUGH: 0
ARTHRALGIAS: 1
CHILLS: 0
CONSTIPATION: 0
NERVOUS/ANXIOUS: 1
HEARTBURN: 0

## 2022-09-08 ASSESSMENT — PAIN SCALES - GENERAL: PAINLEVEL: NO PAIN (0)

## 2022-09-08 NOTE — PROGRESS NOTES
SUBJECTIVE:   CC: Alexis Piña is an 42 year old male who presents for preventative health visit.       Patient has been advised of split billing requirements and indicates understanding: Yes  Healthy Habits:     Getting at least 3 servings of Calcium per day:  Yes    Bi-annual eye exam:  Yes    Dental care twice a year:  Yes    Sleep apnea or symptoms of sleep apnea:  Daytime drowsiness    Diet:  Regular (no restrictions)    Frequency of exercise:  4-5 days/week    Duration of exercise:  30-45 minutes    Taking medications regularly:  Yes    Medication side effects:  None    PHQ-2 Total Score: 2    Additional concerns today:  Yes      {Today's PHQ-2 Score:   PHQ-2 ( 1999 [employer]) 9/7/2022   Q1: Little interest or pleasure in doing things 1   Q2: Feeling down, depressed or hopeless 1   PHQ-2 Score 2   PHQ-2 Total Score (12-17 Years)- Positive if 3 or more points; Administer PHQ-A if positive -   Q1: Little interest or pleasure in doing things Several days   Q2: Feeling down, depressed or hopeless Several days   PHQ-2 Score 2       Abuse: Current or Past(Physical, Sexual or Emotional)- No  Do you feel safe in your environment? Yes    Have you ever done Advance Care Planning? (For example, a Health Directive, POLST, or a discussion with a medical provider or your loved ones about your wishes): No, advance care planning information given to patient to review.  Advanced care planning was discussed at today's visit.    Social History     Tobacco Use     Smoking status: Former Smoker     Smokeless tobacco: Never Used     Tobacco comment: quit 12/03/2005   Substance Use Topics     Alcohol use: Yes     Alcohol/week: 33.3 standard drinks     Comment: 3 times per week, 3 drinks     If you drink alcohol do you typically have >3 drinks per day or >7 drinks per week? No    Alcohol Use 9/7/2022   Prescreen: >3 drinks/day or >7 drinks/week? No   Prescreen: >3 drinks/day or >7 drinks/week? -       Last PSA: No results found for:  PSA    Reviewed orders with patient. Reviewed health maintenance and updated orders accordingly - Yes  Patient Active Problem List   Diagnosis     HYPERLIPIDEMIA LDL GOAL <160     Lumbago     Prediabetes     Obesity     LONNY (generalized anxiety disorder)     Chronic gout without tophus, unspecified cause, unspecified site     Past Surgical History:   Procedure Laterality Date     NO HISTORY OF SURGERY         Social History     Tobacco Use     Smoking status: Former Smoker     Smokeless tobacco: Never Used     Tobacco comment: quit 12/03/2005   Substance Use Topics     Alcohol use: Yes     Alcohol/week: 33.3 standard drinks     Comment: 3 times per week, 3 drinks     Family History   Problem Relation Age of Onset     Neurologic Disorder Mother         MS     Diabetes Paternal Grandmother      Cancer Father         father diagnose with bladder cancer at age 61 yrs old     Coronary Artery Disease Father      C.A.D. No family hx of      Hypertension No family hx of      Cancer - colorectal No family hx of      Prostate Cancer No family hx of          Current Outpatient Medications   Medication Sig Dispense Refill     allopurinol (ZYLOPRIM) 300 MG tablet Take 1 tablet (300 mg) by mouth daily 90 tablet 3     escitalopram (LEXAPRO) 10 MG tablet Take 1 tablet (10 mg) by mouth daily 90 tablet 3     mometasone (ELOCON) 0.1 % external ointment Apply sparingly to affected area. 45 g 6     omeprazole (PRILOSEC) 20 MG DR capsule TAKE 1 CAPSULE(20 MG) BY MOUTH DAILY AS NEEDED 90 capsule 3     SUMAtriptan (IMITREX) 50 MG tablet Take 1 tablet (50 mg) by mouth at onset of headache for migraine May repeat dose in 2 hours.  Do not exceed 200 mg in 24 hours 18 tablet 6       Reviewed and updated as needed this visit by clinical staff   Tobacco  Allergies    Med Hx  Surg Hx  Fam Hx  Soc Hx          Reviewed and updated as needed this visit by Provider                       Review of Systems   Constitutional: Negative for chills and  "fever.   HENT: Negative for congestion, ear pain and hearing loss.    Eyes: Negative for pain.   Respiratory: Negative for cough.    Cardiovascular: Negative for chest pain.   Gastrointestinal: Negative for abdominal pain, constipation, diarrhea, heartburn and hematochezia.   Genitourinary: Negative for frequency, genital sores, hematuria, impotence and penile discharge.   Musculoskeletal: Positive for arthralgias. Negative for joint swelling.   Skin: Positive for rash.   Neurological: Positive for headaches. Negative for dizziness.   Psychiatric/Behavioral: The patient is nervous/anxious.        OBJECTIVE:   /87 (BP Location: Right arm, Patient Position: Sitting, Cuff Size: Adult Regular)   Pulse 70   Temp 97.1  F (36.2  C) (Tympanic)   Resp 16   Ht 1.768 m (5' 9.61\")   Wt 100.8 kg (222 lb 3.2 oz)   SpO2 99%   BMI 32.24 kg/m      Physical Exam  GENERAL: healthy, alert and no distress  EYES: Eyes grossly normal to inspection, PERRL and conjunctivae and sclerae normal  HENT: ear canals and TM's normal, nose and mouth without ulcers or lesions  NECK: no adenopathy, no asymmetry, masses, or scars and thyroid normal to palpation  RESP: lungs clear to auscultation - no rales, rhonchi or wheezes  CV: regular rate and rhythm, normal S1 S2, no S3 or S4, no murmur, click or rub, no peripheral edema and peripheral pulses strong  ABDOMEN: soft, no hepatosplenomegaly, no masses and bowel sounds normal, approximately 2 cm reducible nontender umbilical hernia.  MS: no gross musculoskeletal defects noted, no edema  SKIN: no suspicious lesions or rashes  NEURO: Normal strength and tone, mentation intact and speech normal  PSYCH: mentation appears normal, affect normal/bright    ASSESSMENT/PLAN:   (Z00.00) Routine general medical examination at a health care facility  (primary encounter diagnosis)    (R73.03) Prediabetes  Comment:   Plan: Hemoglobin A1c          (F41.1) LONNY (generalized anxiety disorder)  Comment: " "  Plan: Well-controlled, continue Lexapro.    (E78.5) Hyperlipidemia LDL goal <160  Comment:   Plan: Lipid panel reflex to direct LDL Fasting,         Comprehensive metabolic panel (BMP + Alb, Alk         Phos, ALT, AST, Total. Bili, TP), LDL         cholesterol direct          (M1A.9XX0) Chronic gout without tophus, unspecified cause, unspecified site  Comment: Stable.  Did try stopping allopurinol in the past few years/ developed recurrent gout flares, then resumed allopurinol without recurrent flares since then  Plan: allopurinol (ZYLOPRIM) 300 MG tablet, CBC with         platelets and differential, Uric acid          (L40.9) Psoriasis  Comment:   Plan: mometasone (ELOCON) 0.1 % external ointment          (K21.9) Gastroesophageal reflux disease without esophagitis  Comment:   Plan: omeprazole (PRILOSEC) 20 MG DR capsule          (K42.9) Umbilical hernia without obstruction and without gangrene  Comment:   Plan: Adult General Surg Referral          (Z11.59) Need for hepatitis C screening test  Comment:   Plan: Hepatitis C Screen Reflex to HCV RNA Quant and         Genotype              Patient has been advised of split billing requirements and indicates understanding: Yes    COUNSELING:   Reviewed preventive health counseling, as reflected in patient instructions       Regular exercise       Healthy diet/nutrition       Colorectal cancer screening       Prostate cancer screening    Estimated body mass index is 32.24 kg/m  as calculated from the following:    Height as of this encounter: 1.768 m (5' 9.61\").    Weight as of this encounter: 100.8 kg (222 lb 3.2 oz).     Weight management plan: Discussed healthy diet and exercise guidelines    He reports that he has quit smoking. He has never used smokeless tobacco.      Counseling Resources:  ATP IV Guidelines  Pooled Cohorts Equation Calculator  FRAX Risk Assessment  ICSI Preventive Guidelines  Dietary Guidelines for Americans, 2010  USDA's MyPlate  ASA " Prophylaxis  Lung CA Screening    Tray Monroe MD  [facility]

## 2022-09-08 NOTE — LETTER
Diagnoses: Umbilical hernia without obstruction and without gangrene   Order: Adult General Surg Referral     Surgical Consult   [address]   [facility] [address]   Phone: [phone]   Fax: [phone]       Comment: Please be aware that coverage of these services is subject to the terms and limitations of your health insurance plan.  Call member services at your health plan with any benefit or coverage questions.

## 2022-11-19 ENCOUNTER — HEALTH MAINTENANCE LETTER (OUTPATIENT)
Age: 42
End: 2022-11-19

## 2023-03-15 ENCOUNTER — NURSE TRIAGE (OUTPATIENT)
Dept: PEDIATRICS | Facility: CLINIC | Age: 43
End: 2023-03-15
Payer: COMMERCIAL

## 2023-03-15 NOTE — TELEPHONE ENCOUNTER
"S-(situation): Abdominal pain, severe    B-(background): Pain for 3-4 hours per day for a few months now increasing in duration and intensity     A-(assessment): Umbilical hernia not repaired. Pain rated as severe    R-(recommendations): Report to ED asap.     Due to increased risk with severe abdominal pain and unrepaired hernia that is excruciating if he touches it patient advised to go to ED now. Advice given under triage care advice protocol, patient verbalized understanding and is agreeable to plan. He may go to [facility] as it is closest.     Will route to provider to as FYI to advise.  Please also see highlighted triage initial assessment questions for further details.     Snow Abarca R.N.          Reason for Disposition    SEVERE abdominal pain (e.g., excruciating)    Answer Assessment - Initial Assessment Questions  1. LOCATION: \"Where does it hurt?\"       Entire abdomen on and around umbilicus.   2. RADIATION: \"Does the pain shoot anywhere else?\" (e.g., chest, back)      No  3. ONSET: \"When did the pain begin?\" (Minutes, hours or days ago)       Off and on for 6 months. But 3 of last 4 days seems worse.   4. SUDDEN: \"Gradual or sudden onset?\"      Gradual.   5. PATTERN \"Does the pain come and go, or is it constant?\"     - If constant: \"Is it getting better, staying the same, or worsening?\"       (Note: Constant means the pain never goes away completely; most serious pain is constant and it progresses)      - If intermittent: \"How long does it last?\" \"Do you have pain now?\"      (Note: Intermittent means the pain goes away completely between bouts)      Comes and goes. Hurts for 3-4 hours a day, then goes away and comes back the next day. Is having pain now.   6. SEVERITY: \"How bad is the pain?\"  (e.g., Scale 1-10; mild, moderate, or severe)     - MILD (1-3): doesn't interfere with normal activities, abdomen soft and not tender to touch      - MODERATE (4-7): interferes with normal activities or " "awakens from sleep, abdomen tender to touch      - SEVERE (8-10): excruciating pain, doubled over, unable to do any normal activities        8  7. RECURRENT SYMPTOM: \"Have you ever had this type of stomach pain before?\" If Yes, ask: \"When was the last time?\" and \"What happened that time?\"       Ongoing for several months but increasing in frequency.   8. CAUSE: \"What do you think is causing the stomach pain?\"      Umbilical hernia. If pushes on it is 10 times worse.   9. RELIEVING/AGGRAVATING FACTORS: \"What makes it better or worse?\" (e.g., movement, antacids, bowel movement)      Pushing makes worse, not getting better with antacids and laying down.   10. OTHER SYMPTOMS: \"Do you have any other symptoms?\" (e.g., back pain, diarrhea, fever, urination pain, vomiting)        No    Protocols used: ABDOMINAL PAIN - MALE-A-OH      "

## 2023-03-21 ENCOUNTER — VIRTUAL VISIT (OUTPATIENT)
Dept: PEDIATRICS | Facility: CLINIC | Age: 43
End: 2023-03-21
Payer: COMMERCIAL

## 2023-03-21 DIAGNOSIS — R10.84 ABDOMINAL PAIN, GENERALIZED: Primary | ICD-10-CM

## 2023-03-21 DIAGNOSIS — R19.7 DIARRHEA, UNSPECIFIED TYPE: ICD-10-CM

## 2023-03-21 PROCEDURE — 99213 OFFICE O/P EST LOW 20 MIN: CPT | Mod: VID | Performed by: INTERNAL MEDICINE

## 2023-03-21 NOTE — PROGRESS NOTES
Alexis is a 42 year old who is being evaluated via a billable video visit.      Assessment & Plan     (R10.84) Abdominal pain, generalized  (primary encounter diagnosis)  (R19.7) Diarrhea, unspecified type  Comment: 42-year-old with a history of prediabetes, LONNY and hyperlipidemia presents today for evaluation of intermittent abdominal pain and loose stool/diarrhea.  ER work-up reviewed with patient today.  No improvement on trial of famotidine and Carafate- gastritis or PUD seems less likely.   Consider functional bowel syndrome/IBS, inflammatory bowel disease, microscopic colitis.  Referred to gastroenterology .  Plan: Adult GI  Referral - Consult Only          Tray Monroe MD  [facility] JAZZMINEYA Espinoza is a 42 year old, presenting for the following health issues:      42-year-old presents today for follow-up of recent emergency room visit on 3/14 for evaluation of intermittent abdominal pain.  Describes episodic moderate to severe pain mid abdomen approximately 2 inches above umbilicus.  Pain is typically present each morning and last for several hours improved by midday.  No associated fever or vomiting melena or hematochezia.  He does give a history of loose stool/diarrhea anywhere from 1-5 stools daily.  Does have a prior history of acid reflux.  Emergency room work-up reviewed: Blood work-normal CBC, BMP, lipase, LFTs.  Abdominal CT with contrast negative for evidence of gallstones.  No other significant findings on the CT apart from small fat-containing umbilical hernia with hepatic steatosis.  EKG obtained demonstrated normal sinus rhythm without acute changes.  Troponin obtained was negative.  ER assessment was possible gastritis versus PUD.  Patient does give a history of significant job stress over the past several weeks.  In the ER pain was treated with hydromorphone and GI cocktail with some improvement prior to discharge.  Patient was sent home on Carafate and  Pepcid.    Since ER visit, patient has continued Carafate and Pepcid with little or no improvement.  Continues to note abdominal pain each morning lasting several hours as well as loose stools throughout the day.  Has difficulty identifying any trigger/does not seem to note any particular food intolerance.  Alcohol intake-has 2 beers just a few times per week, does not seem to trigger the symptoms.  Caffeine: 1 cup of coffee each morning and 1 caffeinated soda midday-stopped drinking caffeine several days ago-no change in symptoms.      Patient Active Problem List   Diagnosis     HYPERLIPIDEMIA LDL GOAL <160     Lumbago     Prediabetes     Obesity     LONNY (generalized anxiety disorder)     Chronic gout without tophus, unspecified cause, unspecified site     Current Outpatient Medications   Medication Sig Dispense Refill     allopurinol (ZYLOPRIM) 300 MG tablet Take 1 tablet (300 mg) by mouth daily 90 tablet 3     escitalopram (LEXAPRO) 10 MG tablet Take 1 tablet (10 mg) by mouth daily 90 tablet 3     mometasone (ELOCON) 0.1 % external ointment Apply sparingly to affected area. 45 g 6     omeprazole (PRILOSEC) 20 MG DR capsule TAKE 1 CAPSULE(20 MG) BY MOUTH DAILY AS NEEDED 90 capsule 3     SUMAtriptan (IMITREX) 50 MG tablet Take 1 tablet (50 mg) by mouth at onset of headache for migraine May repeat dose in 2 hours.  Do not exceed 200 mg in 24 hours 18 tablet 6          Objective           Vitals:  No vitals were obtained today due to virtual visit.    Physical Exam   GENERAL: Healthy, alert and no distress  EYES: Eyes grossly normal to inspection.  No discharge or erythema, or obvious scleral/conjunctival abnormalities.  RESP: No audible wheeze, cough, or visible cyanosis.  No visible retractions or increased work of breathing.    SKIN: Visible skin clear. No significant rash, abnormal pigmentation or lesions.  NEURO: Cranial nerves grossly intact.  Mentation and speech appropriate for age.  PSYCH: Mentation appears  normal, affect normal/bright, judgement and insight intact, normal speech and appearance well-groomed.                Video-Visit Details    Type of service:  Video Visit     Originating Location (pt. Location): Home    Distant Location (provider location):  On-site  Platform used for Video Visit: MD Insider     Video start: 10:36am  Video end: 10:56am

## 2023-08-09 ENCOUNTER — PATIENT OUTREACH (OUTPATIENT)
Dept: CARE COORDINATION | Facility: CLINIC | Age: 43
End: 2023-08-09
Payer: COMMERCIAL

## 2023-08-23 ENCOUNTER — PATIENT OUTREACH (OUTPATIENT)
Dept: CARE COORDINATION | Facility: CLINIC | Age: 43
End: 2023-08-23
Payer: COMMERCIAL

## 2023-09-11 ENCOUNTER — MYC MEDICAL ADVICE (OUTPATIENT)
Dept: PEDIATRICS | Facility: CLINIC | Age: 43
End: 2023-09-11

## 2023-09-11 ENCOUNTER — VIRTUAL VISIT (OUTPATIENT)
Dept: PEDIATRICS | Facility: CLINIC | Age: 43
End: 2023-09-11
Payer: COMMERCIAL

## 2023-09-11 DIAGNOSIS — E66.9 OBESITY, UNSPECIFIED CLASSIFICATION, UNSPECIFIED OBESITY TYPE, UNSPECIFIED WHETHER SERIOUS COMORBIDITY PRESENT: Primary | ICD-10-CM

## 2023-09-11 DIAGNOSIS — F41.1 GAD (GENERALIZED ANXIETY DISORDER): Primary | ICD-10-CM

## 2023-09-11 DIAGNOSIS — E66.9 OBESITY, UNSPECIFIED CLASSIFICATION, UNSPECIFIED OBESITY TYPE, UNSPECIFIED WHETHER SERIOUS COMORBIDITY PRESENT: ICD-10-CM

## 2023-09-11 PROBLEM — F10.20 ALCOHOL DEPENDENCE (H): Status: ACTIVE | Noted: 2023-09-11

## 2023-09-11 PROCEDURE — 99214 OFFICE O/P EST MOD 30 MIN: CPT | Mod: VID | Performed by: INTERNAL MEDICINE

## 2023-09-11 NOTE — PROGRESS NOTES
Alexis is a 43 year old who is being evaluated via a billable video visit.      Assessment & Plan     (F41.1) LONNY (generalized anxiety disorder)  (primary encounter diagnosis)  Comment: Discussed options including switch to buspirone which may be more weight neutral.  Patient would like to try switching to sertraline instead.  Discontinue Lexapro, start sertraline 50 mg daily and follow-up 2 months.  Plan: sertraline (ZOLOFT) 50 MG tablet          (E66.9) Obesity, unspecified classification, unspecified obesity type, unspecified whether serious comorbidity present  Comment:   Plan: Weight management.  Discussed Wegovy use and side effects.  Encouraged patient to check insurance coverage for this medication.  If too expensive discussed other alternatives such as topiramate for appetite suppression    Tray Monroe MD  [facility]   Alexis is a 43 year old, presenting for the following health issues:    HPI   LONNY follow-up.  Symptoms well controlled on Lexapro.  Notes 10-15 pound weight gain since starting the medication.  Has noted weight gain each time he has started Lexapro and has lost weight off the medication.  Would like to make a change.  Specifically would like to try sertraline.    Obesity.  Family history of obesity and longstanding issues with weight management.  Has generally managed with diet and regular exercise, but notes that weight has crept up over the past few months.  Requesting trial of Ozempic.  No history of diabetes.    Patient Active Problem List   Diagnosis    HYPERLIPIDEMIA LDL GOAL <160    Lumbago    Prediabetes    Obesity    LONNY (generalized anxiety disorder)    Chronic gout without tophus, unspecified cause, unspecified site           Review of Systems         Objective           Vitals:  No vitals were obtained today due to virtual visit.    Physical Exam   GENERAL: Healthy, alert and no distress  EYES: Eyes grossly normal to inspection.  No discharge or  erythema, or obvious scleral/conjunctival abnormalities.  RESP: No audible wheeze, cough, or visible cyanosis.  No visible retractions or increased work of breathing.    SKIN: Visible skin clear. No significant rash, abnormal pigmentation or lesions.  NEURO: Cranial nerves grossly intact.  Mentation and speech appropriate for age.  PSYCH: Mentation appears normal, affect normal/bright, judgement and insight intact, normal speech and appearance well-groomed.        Video-Visit Details    Type of service:  Video Visit     Originating Location (pt. Location): Home    Distant Location (provider location):  On-site  Platform used for Video Visit: Vcommerce  Video start: 4:52pm  Video end:  5:04pm

## 2023-09-15 ENCOUNTER — TELEPHONE (OUTPATIENT)
Dept: PEDIATRICS | Facility: CLINIC | Age: 43
End: 2023-09-15
Payer: COMMERCIAL

## 2023-09-15 DIAGNOSIS — E66.9 OBESITY, UNSPECIFIED CLASSIFICATION, UNSPECIFIED OBESITY TYPE, UNSPECIFIED WHETHER SERIOUS COMORBIDITY PRESENT: ICD-10-CM

## 2023-09-15 NOTE — TELEPHONE ENCOUNTER
Prior Authorization Retail Medication Request    Medication/Dose: Semaglutide-Weight Management (WEGOVY) 0.25 MG/0.5ML pen  ICD code (if different than what is on RX):  E66.9  Previously Tried and Failed:    Rationale:      Insurance Name:  [facility]  Insurance ID:  [id number]       Pharmacy Information (if different than what is on RX)  Name:  [facility]  Phone:  [phone]    Navin MYERS RN 9/15/2023 at 3:16 PM

## 2023-09-20 NOTE — TELEPHONE ENCOUNTER
Prior Authorization Approval    Medication: SEMAGLUTIDE-WEIGHT MANAGEMENT 0.25 MG/0.5ML SC SOAJ  Authorization Effective Date: 9/20/2023  Authorization Expiration Date: 9/20/2024  Approved Dose/Quantity:   Reference #: [id number]   Insurance Company: [employer] - Phone [phone] Fax [phone]  Which Pharmacy is filling the prescription: [id number] - [place], [place] - [address]  Pharmacy Notified: Yes  Patient Notified: Yes

## 2023-09-20 NOTE — TELEPHONE ENCOUNTER
PA Initiation    Medication: SEMAGLUTIDE-WEIGHT MANAGEMENT 0.25 MG/0.5ML SC SOAJ  Insurance Company: [employer] - Phone [phone] Fax [phone]  Pharmacy Filling the Rx: [id number] - [place], [place] - [address]  Filling Pharmacy Phone: [phone]  Filling Pharmacy Fax: [phone]  Start Date: 9/20/2023

## 2023-09-22 NOTE — TELEPHONE ENCOUNTER
PA approved, called pharmacy to check on supply.     They do not have this strength in stock.    Sent my chart to patient regarding this.    ROSE Rubio on 9/22/2023 at 12:30 PM

## 2023-10-02 DIAGNOSIS — K21.9 GASTROESOPHAGEAL REFLUX DISEASE WITHOUT ESOPHAGITIS: ICD-10-CM

## 2023-10-03 NOTE — TELEPHONE ENCOUNTER
Prescription approved per [facility] Refill Protocol.    Linda MICHAELS RN   [facility]

## 2023-10-13 DIAGNOSIS — M1A.9XX0 CHRONIC GOUT WITHOUT TOPHUS, UNSPECIFIED CAUSE, UNSPECIFIED SITE: ICD-10-CM

## 2023-10-13 RX ORDER — ALLOPURINOL 300 MG/1
1 TABLET ORAL DAILY
Qty: 90 TABLET | Refills: 3 | Status: SHIPPED | OUTPATIENT
Start: 2023-10-13

## 2023-10-13 RX ORDER — ESCITALOPRAM OXALATE 10 MG/1
10 TABLET ORAL DAILY
Qty: 90 TABLET | Refills: 2 | OUTPATIENT
Start: 2023-10-13

## 2023-10-13 NOTE — TELEPHONE ENCOUNTER
Routing refill request to provider for review/approval because:  Labs not current:    Uric Acid   Date Value Ref Range Status   09/08/2022 5.8 3.5 - 7.2 mg/dL Final   08/07/2019 5.0 3.5 - 7.2 mg/dL Final

## 2023-11-18 ENCOUNTER — HEALTH MAINTENANCE LETTER (OUTPATIENT)
Age: 43
End: 2023-11-18

## 2024-01-17 ENCOUNTER — TELEPHONE (OUTPATIENT)
Dept: SLEEP MEDICINE | Facility: CLINIC | Age: 44
End: 2024-01-17
Payer: COMMERCIAL

## 2024-01-17 NOTE — TELEPHONE ENCOUNTER
Pt calling in regards to having another sleep study done.  Pt was seen [facility] in April 2018. Pt did HST pt feels his sleep has changed a lot and has been using [facility] eduardo. And he said the snoring is much worse.  Pt has appt for 4/30. Pt is wondering if it ispossible to just has a new sleep study done. Pt is open to in lab sleep study or a HST again.  Please call and advise pt best option.

## 2024-01-18 NOTE — TELEPHONE ENCOUNTER
Detailed message left advising patient he will need to be seen before any study can be ordered and to keep his appointment.     Parisa MICHAELS RN  [facility]

## 2024-04-16 SDOH — HEALTH STABILITY: PHYSICAL HEALTH: ON AVERAGE, HOW MANY MINUTES DO YOU ENGAGE IN EXERCISE AT THIS LEVEL?: 30 MIN

## 2024-04-16 SDOH — HEALTH STABILITY: PHYSICAL HEALTH: ON AVERAGE, HOW MANY DAYS PER WEEK DO YOU ENGAGE IN MODERATE TO STRENUOUS EXERCISE (LIKE A BRISK WALK)?: 6 DAYS

## 2024-04-16 ASSESSMENT — SOCIAL DETERMINANTS OF HEALTH (SDOH): HOW OFTEN DO YOU GET TOGETHER WITH FRIENDS OR RELATIVES?: TWICE A WEEK

## 2024-04-17 ENCOUNTER — OFFICE VISIT (OUTPATIENT)
Dept: PEDIATRICS | Facility: CLINIC | Age: 44
End: 2024-04-17
Payer: COMMERCIAL

## 2024-04-17 ENCOUNTER — TELEPHONE (OUTPATIENT)
Dept: PEDIATRICS | Facility: CLINIC | Age: 44
End: 2024-04-17

## 2024-04-17 VITALS
RESPIRATION RATE: 18 BRPM | BODY MASS INDEX: 34.6 KG/M2 | HEART RATE: 87 BPM | DIASTOLIC BLOOD PRESSURE: 89 MMHG | OXYGEN SATURATION: 96 % | TEMPERATURE: 97.1 F | SYSTOLIC BLOOD PRESSURE: 129 MMHG | HEIGHT: 70 IN | WEIGHT: 241.7 LBS

## 2024-04-17 DIAGNOSIS — Z12.11 SCREEN FOR COLON CANCER: ICD-10-CM

## 2024-04-17 DIAGNOSIS — M1A.9XX0 CHRONIC GOUT WITHOUT TOPHUS, UNSPECIFIED CAUSE, UNSPECIFIED SITE: ICD-10-CM

## 2024-04-17 DIAGNOSIS — E78.5 HYPERLIPIDEMIA LDL GOAL <160: ICD-10-CM

## 2024-04-17 DIAGNOSIS — Z12.5 SCREENING FOR PROSTATE CANCER: ICD-10-CM

## 2024-04-17 DIAGNOSIS — F41.1 GAD (GENERALIZED ANXIETY DISORDER): ICD-10-CM

## 2024-04-17 DIAGNOSIS — E66.9 OBESITY, UNSPECIFIED CLASSIFICATION, UNSPECIFIED OBESITY TYPE, UNSPECIFIED WHETHER SERIOUS COMORBIDITY PRESENT: ICD-10-CM

## 2024-04-17 DIAGNOSIS — Z00.00 ROUTINE GENERAL MEDICAL EXAMINATION AT A HEALTH CARE FACILITY: Primary | ICD-10-CM

## 2024-04-17 DIAGNOSIS — K21.9 GASTROESOPHAGEAL REFLUX DISEASE WITHOUT ESOPHAGITIS: ICD-10-CM

## 2024-04-17 DIAGNOSIS — R73.03 PREDIABETES: ICD-10-CM

## 2024-04-17 DIAGNOSIS — G47.9 SLEEP DISORDER: ICD-10-CM

## 2024-04-17 LAB
BASOPHILS # BLD AUTO: 0 10E3/UL (ref 0–0.2)
BASOPHILS NFR BLD AUTO: 0 %
EOSINOPHIL # BLD AUTO: 0.2 10E3/UL (ref 0–0.7)
EOSINOPHIL NFR BLD AUTO: 3 %
ERYTHROCYTE [DISTWIDTH] IN BLOOD BY AUTOMATED COUNT: 12.8 % (ref 10–15)
HBA1C MFR BLD: 5.5 % (ref 0–5.6)
HCT VFR BLD AUTO: 45.7 % (ref 40–53)
HGB BLD-MCNC: 16.2 G/DL (ref 13.3–17.7)
IMM GRANULOCYTES # BLD: 0 10E3/UL
IMM GRANULOCYTES NFR BLD: 0 %
LYMPHOCYTES # BLD AUTO: 1.6 10E3/UL (ref 0.8–5.3)
LYMPHOCYTES NFR BLD AUTO: 25 %
MCH RBC QN AUTO: 31.4 PG (ref 26.5–33)
MCHC RBC AUTO-ENTMCNC: 35.4 G/DL (ref 31.5–36.5)
MCV RBC AUTO: 89 FL (ref 78–100)
MONOCYTES # BLD AUTO: 0.5 10E3/UL (ref 0–1.3)
MONOCYTES NFR BLD AUTO: 8 %
NEUTROPHILS # BLD AUTO: 4 10E3/UL (ref 1.6–8.3)
NEUTROPHILS NFR BLD AUTO: 64 %
PLATELET # BLD AUTO: 194 10E3/UL (ref 150–450)
RBC # BLD AUTO: 5.16 10E6/UL (ref 4.4–5.9)
WBC # BLD AUTO: 6.2 10E3/UL (ref 4–11)

## 2024-04-17 PROCEDURE — 83721 ASSAY OF BLOOD LIPOPROTEIN: CPT | Mod: 59 | Performed by: INTERNAL MEDICINE

## 2024-04-17 PROCEDURE — 80061 LIPID PANEL: CPT | Performed by: INTERNAL MEDICINE

## 2024-04-17 PROCEDURE — 99213 OFFICE O/P EST LOW 20 MIN: CPT | Mod: 25 | Performed by: INTERNAL MEDICINE

## 2024-04-17 PROCEDURE — 80053 COMPREHEN METABOLIC PANEL: CPT | Performed by: INTERNAL MEDICINE

## 2024-04-17 PROCEDURE — 36415 COLL VENOUS BLD VENIPUNCTURE: CPT | Performed by: INTERNAL MEDICINE

## 2024-04-17 PROCEDURE — 85025 COMPLETE CBC W/AUTO DIFF WBC: CPT | Performed by: INTERNAL MEDICINE

## 2024-04-17 PROCEDURE — 83036 HEMOGLOBIN GLYCOSYLATED A1C: CPT | Performed by: INTERNAL MEDICINE

## 2024-04-17 PROCEDURE — G0103 PSA SCREENING: HCPCS | Performed by: INTERNAL MEDICINE

## 2024-04-17 PROCEDURE — 90715 TDAP VACCINE 7 YRS/> IM: CPT | Performed by: INTERNAL MEDICINE

## 2024-04-17 PROCEDURE — 84550 ASSAY OF BLOOD/URIC ACID: CPT | Performed by: INTERNAL MEDICINE

## 2024-04-17 PROCEDURE — 99396 PREV VISIT EST AGE 40-64: CPT | Mod: 25 | Performed by: INTERNAL MEDICINE

## 2024-04-17 PROCEDURE — 90471 IMMUNIZATION ADMIN: CPT | Performed by: INTERNAL MEDICINE

## 2024-04-17 ASSESSMENT — PAIN SCALES - GENERAL: PAINLEVEL: NO PAIN (0)

## 2024-04-17 NOTE — PROGRESS NOTES
"Preventive Care Visit  [facility] JAZZMINE Monroe MD, Internal Medicine - Pediatrics  Apr 17, 2024      Assessment & Plan     (Z00.00) Routine general medical examination at a health care facility  (primary encounter diagnosis)    (R73.03) Prediabetes  Comment:   Plan: Hemoglobin A1c          (E78.5) Hyperlipidemia LDL goal <160  Comment:   Plan: Lipid panel reflex to direct LDL Non-fasting,         Comprehensive metabolic panel (BMP + Alb, Alk         Phos, ALT, AST, Total. Bili, TP)          (G47.9) Sleep disorder  Comment: chronic insomnia  Plan: reviewed sleep hygiene, plans to see Sleep Medicine    (E66.9) Obesity, unspecified classification, unspecified obesity type, unspecified whether serious comorbidity present  Comment: has been unable to fill Weygovy script for 5 months due to supply issues.  Discussed alternatives:Script for Zepbound-Medication side effects discussed.   Plan: tirzepatide-Weight Management (ZEPBOUND) 2.5         MG/0.5ML prefilled pen          (F41.1) LONNY (generalized anxiety disorder)  Comment: well controlled  Plan: sertraline (ZOLOFT) 50 MG tablet          (K21.9) Gastroesophageal reflux disease without esophagitis  Comment:   Plan: omeprazole (PRILOSEC) 20 MG DR capsule          (M1A.9XX0) Chronic gout without tophus, unspecified cause, unspecified site  Comment:   due for labwork, no recent flare  Plan: CBC with platelets and differential, Uric acid          (Z12.11) Screen for colon cancer  Comment:   Plan: Colonoscopy Screening  Referral          (Z12.5) Screening for prostate cancer  Comment:   Plan: PSA, screen                      BMI  Estimated body mass index is 34.8 kg/m  as calculated from the following:    Height as of this encounter: 1.775 m (5' 9.88\").    Weight as of this encounter: 109.6 kg (241 lb 11.2 oz).       Counseling  Appropriate preventive services were discussed with this patient, including applicable screening as appropriate for " fall prevention, nutrition, physical activity, Tobacco-use cessation, weight loss and cognition.  Checklist reviewing preventive services available has been given to the patient.  Reviewed patient's diet, addressing concerns and/or questions.           Elsi Espinoza is a 43 year old, presenting for the following:  Physical        4/17/2024    10:44 AM   Additional Questions   Roomed by Nicole STEVENS   Accompanied by N/A         4/17/2024    10:44 AM   Patient Reported Additional Medications   Patient reports taking the following new medications None              4/16/2024   General Health   How would you rate your overall physical health? Good   Feel stress (tense, anxious, or unable to sleep) Very much   (!) STRESS CONCERN      4/16/2024   Nutrition   Three or more servings of calcium each day? Yes   Diet: Regular (no restrictions)   How many servings of fruit and vegetables per day? (!) 0-1   How many sweetened beverages each day? 0-1         4/16/2024   Exercise   Days per week of moderate/strenous exercise 6 days   Average minutes spent exercising at this level 30 min         4/16/2024   Social Factors   Frequency of gathering with friends or relatives Twice a week   Worry food won't last until get money to buy more No   Food not last or not have enough money for food? No   Do you have housing?  Yes   Are you worried about losing your housing? No   Lack of transportation? No   Unable to get utilities (heat,electricity)? No         4/16/2024   Dental   Dentist two times every year? Yes         4/16/2024   TB Screening   Were you born outside of the [place]? No         Today's PHQ-2 Score:       4/16/2024    11:59 AM   PHQ-2 ( 1999 [employer])   Q1: Little interest or pleasure in doing things 1   Q2: Feeling down, depressed or hopeless 1   PHQ-2 Score 2   Q1: Little interest or pleasure in doing things Several days   Q2: Feeling down, depressed or hopeless Several days   PHQ-2 Score 2           4/16/2024   Substance Use    Alcohol more than 3/day or more than 7/wk No   Do you use any other substances recreationally? No     Social History     Tobacco Use    Smoking status: Former    Smokeless tobacco: Never    Tobacco comments:     quit 12/03/2005   Substance Use Topics    Alcohol use: Yes     Alcohol/week: 33.3 standard drinks of alcohol     Comment: 3 times per week, 3 drinks    Drug use: No           4/16/2024   STI Screening   New sexual partner(s) since last STI/HIV test? No   ASCVD Risk   The 10-year ASCVD risk score (Jaquelin PENN, et al., 2019) is: 3.7%    Values used to calculate the score:      Age: 43 years      Sex: Male      Is Non- : No      Diabetic: No      Tobacco smoker: No      Systolic Blood Pressure: 129 mmHg      Is BP treated: No      HDL Cholesterol: 34 mg/dL      Total Cholesterol: 214 mg/dL        4/16/2024   Contraception/Family Planning   Questions about contraception or family planning No        Reviewed and updated as needed this visit by Provider                    Patient Active Problem List   Diagnosis    HYPERLIPIDEMIA LDL GOAL <160    Lumbago    Prediabetes    Obesity    LONNY (generalized anxiety disorder)    Chronic gout without tophus, unspecified cause, unspecified site     Past Surgical History:   Procedure Laterality Date    NO HISTORY OF SURGERY         Social History     Tobacco Use    Smoking status: Former    Smokeless tobacco: Never    Tobacco comments:     quit 12/03/2005   Substance Use Topics    Alcohol use: Yes     Alcohol/week: 33.3 standard drinks of alcohol     Comment: 3 times per week, 3 drinks     Family History   Problem Relation Age of Onset    Neurologic Disorder Mother         MS    Cancer Father         father diagnose with bladder cancer at age 61 yrs old    Coronary Artery Disease Father     Diabetes Paternal Grandmother     C.A.D. No family hx of     Hypertension No family hx of     Cancer - colorectal No family hx of     Prostate Cancer No  "family hx of          Current Outpatient Medications   Medication Sig Dispense Refill    allopurinol (ZYLOPRIM) 300 MG tablet TAKE 1 TABLET BY MOUTH EVERY DAY 90 tablet 3    mometasone (ELOCON) 0.1 % external ointment Apply sparingly to affected area. 45 g 6    omeprazole (PRILOSEC) 20 MG DR capsule TAKE 1 CAPSULE BY MOUTH EVERY DAY AS NEEDED 90 capsule 3    sertraline (ZOLOFT) 50 MG tablet Take 1 tablet (50 mg) by mouth daily 90 tablet 3    SUMAtriptan (IMITREX) 50 MG tablet Take 1 tablet (50 mg) by mouth at onset of headache for migraine May repeat dose in 2 hours.  Do not exceed 200 mg in 24 hours 18 tablet 6           Semaglutide-Weight Management (WEGOVY) 0.25 MG/0.5ML pen 0.25mg subcutaneous weekly for 4 weeks, then start 0.5mg subcutaneous weekly (Patient not taking: Reported on 4/17/2024) 6 mL 3         Review of Systems  Constitutional, neuro, ENT, endocrine, pulmonary, cardiac, gastrointestinal, genitourinary, musculoskeletal, integument and psychiatric systems are negative, except as otherwise noted.     Objective    Exam  /89 (BP Location: Right arm, Patient Position: Sitting, Cuff Size: Adult Large)   Pulse 87   Temp 97.1  F (36.2  C) (Tympanic)   Resp 18   Ht 1.775 m (5' 9.88\")   Wt 109.6 kg (241 lb 11.2 oz)   SpO2 96%   BMI 34.80 kg/m     Estimated body mass index is 34.8 kg/m  as calculated from the following:    Height as of this encounter: 1.775 m (5' 9.88\").    Weight as of this encounter: 109.6 kg (241 lb 11.2 oz).    Physical Exam  GENERAL: alert and no distress  EYES: Eyes grossly normal to inspection, PERRL and conjunctivae and sclerae normal  HENT: ear canals and TM's normal, nose and mouth without ulcers or lesions  NECK: no adenopathy, no asymmetry, masses, or scars  RESP: lungs clear to auscultation - no rales, rhonchi or wheezes  CV: regular rate and rhythm, normal S1 S2, no S3 or S4, no murmur, click or rub, no peripheral edema  ABDOMEN: soft, nontender, no " hepatosplenomegaly, no masses and bowel sounds normal  MS: no gross musculoskeletal defects noted, no edema  SKIN: no suspicious lesions or rashes  NEURO: Normal strength and tone, mentation intact and speech normal  PSYCH: mentation appears normal, affect normal/bright        Signed Electronically by: Tray Monroe MD

## 2024-04-17 NOTE — PATIENT INSTRUCTIONS
Preventive Care Advice   This is general advice given by our system to help you stay healthy. However, your care team may have specific advice just for you. Please talk to your care team about your preventive care needs.  Nutrition  Eat 5 or more servings of fruits and vegetables each day.  Try wheat bread, brown rice and whole grain pasta (instead of white bread, rice, and pasta).  Get enough calcium and vitamin D. Check the label on foods and aim for 100% of the RDA (recommended daily allowance).  Lifestyle  Exercise at least 150 minutes each week   (30 minutes a day, 5 days a week).  Do muscle strengthening activities 2 days a week. These help control your weight and prevent disease.  No smoking.  Wear sunscreen to prevent skin cancer.  Have a dental exam and cleaning every 6 months.  Yearly exams  See your health care team every year to talk about:  Any changes in your health.  Any medicines your care team has prescribed.  Preventive care, family planning, and ways to prevent chronic diseases.  Shots (vaccines)   HPV shots (up to age 26), if you've never had them before.  Hepatitis B shots (up to age 59), if you've never had them before.  COVID-19 shot: Get this shot when it's due.  Flu shot: Get a flu shot every year.  Tetanus shot: Get a tetanus shot every 10 years.  Pneumococcal, hepatitis A, and RSV shots: Ask your care team if you need these based on your risk.  Shingles shot (for age 50 and up).  General health tests  Diabetes screening:  Starting at age 35, Get screened for diabetes at least every 3 years.  If you are younger than age 35, ask your care team if you should be screened for diabetes.  Cholesterol test: At age 39, start having a cholesterol test every 5 years, or more often if advised.  Bone density scan (DEXA): At age 50, ask your care team if you should have this scan for osteoporosis (brittle bones).  Hepatitis C: Get tested at least once in your life.  STIs (sexually transmitted  The right atrium was hand injected. The total injected volume was 4 mL. infections)  Before age 24: Ask your care team if you should be screened for STIs.  After age 24: Get screened for STIs if you're at risk. You are at risk for STIs (including HIV) if:  You are sexually active with more than one person.  You don't use condoms every time.  You or a partner was diagnosed with a sexually transmitted infection.  If you are at risk for HIV, ask about PrEP medicine to prevent HIV.  Get tested for HIV at least once in your life, whether you are at risk for HIV or not.  Cancer screening tests  Cervical cancer screening: If you have a cervix, begin getting regular cervical cancer screening tests at age 21. Most people who have regular screenings with normal results can stop after age 65. Talk about this with your provider.  Breast cancer scan (mammogram): If you've ever had breasts, begin having regular mammograms starting at age 40. This is a scan to check for breast cancer.  Colon cancer screening: It is important to start screening for colon cancer at age 45.  Have a colonoscopy test every 10 years (or more often if you're at risk) Or, ask your provider about stool tests like a FIT test every year or Cologuard test every 3 years.  To learn more about your testing options, visit: https://www.[email or web address]/293620.pdf.  For help making a decision, visit: https://bit.ly/ea57014.  Prostate cancer screening test: If you have a prostate and are age 55 to 69, ask your provider if you would benefit from a yearly prostate cancer screening test.  Lung cancer screening: If you are a current or former smoker age 50 to 80, ask your care team if ongoing lung cancer screenings are right for you.  For informational purposes only. Not to replace the advice of your health care provider. Copyright   2023 [employer]. All rights reserved. Clinically reviewed by the [facility] Transitions Program. Kabam 999328 - REV 01/24.

## 2024-04-17 NOTE — TELEPHONE ENCOUNTER
Prior Authorization Retail Medication Request    Medication/Dose: Zepbound 2.5mg  Diagnosis and ICD code (if different than what is on RX):    New/renewal/insurance change PA/secondary ins. PA:  Previously Tried and Failed:    Rationale:      Insurance   Primary: [employer] [place] [employer]  Insurance ID:  [id number]    Secondary (if applicable):  Insurance ID:      Pharmacy Information (if different than what is on RX)  Name:  [facility]  Phone:  [phone]  Fax:    PA REQUIRED. PLEASE FAX -[phone]    Thank you,  Myla Garcia CPhT  [facility]

## 2024-04-18 NOTE — TELEPHONE ENCOUNTER
Retail Pharmacy Prior Authorization Team   Phone: [phone]    [facility] APPROVED - called pharmacy to notify of approval and they will notify the patient when ready

## 2024-04-19 ENCOUNTER — MYC MEDICAL ADVICE (OUTPATIENT)
Dept: PEDIATRICS | Facility: CLINIC | Age: 44
End: 2024-04-19
Payer: COMMERCIAL

## 2024-04-19 DIAGNOSIS — E66.9 OBESITY, UNSPECIFIED CLASSIFICATION, UNSPECIFIED OBESITY TYPE, UNSPECIFIED WHETHER SERIOUS COMORBIDITY PRESENT: Primary | ICD-10-CM

## 2024-04-19 LAB
ALBUMIN SERPL BCG-MCNC: 4.8 G/DL (ref 3.5–5.2)
ALP SERPL-CCNC: 79 U/L (ref 40–150)
ALT SERPL W P-5'-P-CCNC: 45 U/L (ref 0–70)
ANION GAP SERPL CALCULATED.3IONS-SCNC: 15 MMOL/L (ref 7–15)
AST SERPL W P-5'-P-CCNC: 31 U/L (ref 0–45)
BILIRUB SERPL-MCNC: 0.6 MG/DL
BUN SERPL-MCNC: 11.7 MG/DL (ref 6–20)
CALCIUM SERPL-MCNC: 9.6 MG/DL (ref 8.6–10)
CHLORIDE SERPL-SCNC: 102 MMOL/L (ref 98–107)
CHOLEST SERPL-MCNC: 236 MG/DL
CREAT SERPL-MCNC: 0.9 MG/DL (ref 0.67–1.17)
DEPRECATED HCO3 PLAS-SCNC: 22 MMOL/L (ref 22–29)
EGFRCR SERPLBLD CKD-EPI 2021: >90 ML/MIN/1.73M2
FASTING STATUS PATIENT QL REPORTED: NO
GLUCOSE SERPL-MCNC: 99 MG/DL (ref 70–99)
HDLC SERPL-MCNC: 31 MG/DL
LDLC SERPL CALC-MCNC: ABNORMAL MG/DL
LDLC SERPL DIRECT ASSAY-MCNC: 112 MG/DL
NONHDLC SERPL-MCNC: 205 MG/DL
POTASSIUM SERPL-SCNC: 4.1 MMOL/L (ref 3.4–5.3)
PROT SERPL-MCNC: 7.8 G/DL (ref 6.4–8.3)
SODIUM SERPL-SCNC: 139 MMOL/L (ref 135–145)
TRIGL SERPL-MCNC: 614 MG/DL
URATE SERPL-MCNC: 6.2 MG/DL (ref 3.4–7)

## 2024-04-20 LAB — PSA SERPL DL<=0.01 NG/ML-MCNC: 0.59 NG/ML (ref 0–2.5)

## 2024-04-21 NOTE — TELEPHONE ENCOUNTER
"Please call pharmacy. [facility] [address]\  Clarify:   pharmacist requesting \"2nd dose\" of both Wegovy and Zepbound.  I    Is pharmacy requesting?:  1) Wegovy 0.5mg weekly and  2) Zepbound 5mg weekly     If not please clarify which dose of each medication they would like sent.      "

## 2024-04-22 DIAGNOSIS — E66.9 OBESITY, UNSPECIFIED CLASSIFICATION, UNSPECIFIED OBESITY TYPE, UNSPECIFIED WHETHER SERIOUS COMORBIDITY PRESENT: ICD-10-CM

## 2024-04-22 NOTE — TELEPHONE ENCOUNTER
Called the pharmacy and spoke to Chiki, pharmacist.      Wegovy - he has not been on it recently at least from [facility] in [facility] - there pharmacy - he said the prescription was deleted from September.  He said it usually is recommended to go low when starting over - side effect related.  Right now it is on back order until late April - May.  Both 0.25 mg and 0.5 mg of wegovy are on back order.    Zepbound is also on back order.  It maybe could be May for the lower doses.  No record of filling zepbound for the pt at there pharmacy.  The lowest dose is 2.5 mg.  If he has not had it, he would recommend the low doses also due to possible side effects.      He said he did not have any notes on his end in regard to this.

## 2024-04-22 NOTE — TELEPHONE ENCOUNTER
Medication Question or Refill    Contacts         Type Contact Phone/Fax    04/22/2024 05:03 PM CDT Phone (Incoming) Efrainmalina Alexis SHAW (Self) [phone] (H)            What medication are you calling about (include dose and sig)?:  Wegovy    Preferred Pharmacy:[facility] in [facility] [id number] [facility] [place], [place] - [address]  [address]  [place] [address]  Phone: [phone] Fax: [phone]      Controlled Substance Agreement on file:   CSA -- Patient Level:    CSA: None found at the patient level.       Who prescribed the medication?: Dr. Monroe    Do you need a refill? New prescription that needs to be filled    When did you use the medication last? Has not used medication before    Patient offered an appointment? No    Do you have any questions or concerns?  Yes: Patient believes medication has been ordered but pharmacy does not have prescription      Could we send this information to you in [facility] or would you prefer to receive a phone call?:   Patient would prefer a phone call   Okay to leave a detailed message?: Yes at Cell number on file:    Telephone Information:   Mobile [phone]

## 2024-04-23 ENCOUNTER — TRANSFERRED RECORDS (OUTPATIENT)
Dept: HEALTH INFORMATION MANAGEMENT | Facility: CLINIC | Age: 44
End: 2024-04-23
Payer: COMMERCIAL

## 2024-04-23 DIAGNOSIS — E66.9 OBESITY, UNSPECIFIED CLASSIFICATION, UNSPECIFIED OBESITY TYPE, UNSPECIFIED WHETHER SERIOUS COMORBIDITY PRESENT: Primary | ICD-10-CM

## 2024-04-23 NOTE — TELEPHONE ENCOUNTER
Reason for Call:  Other prescription    Detailed comments: PHARMACY IS ASKING FOR NEXT DOSAGE OF PRESCRIPTION TO BE SENT FOR  MAY 2024 (4 WEEKS OUT)    Semaglutide-Weight Management (WEGOVY) 0.25 MG/0.5ML pen [[id number]]     PLEASE UPDATE PHARMACY TO:     [facility] IN [place]    Phone Number Patient can be reached at: Cell number on file:    Telephone Information:   Mobile [phone]       Best Time: ASAP    Can we leave a detailed message on this number? YES    Call taken on 4/23/2024 at 11:43 AM by Yessenia Lassiter

## 2024-04-25 ENCOUNTER — TRANSFERRED RECORDS (OUTPATIENT)
Dept: HEALTH INFORMATION MANAGEMENT | Facility: CLINIC | Age: 44
End: 2024-04-25
Payer: COMMERCIAL

## 2024-04-29 ASSESSMENT — SLEEP AND FATIGUE QUESTIONNAIRES
HOW LIKELY ARE YOU TO NOD OFF OR FALL ASLEEP WHILE SITTING AND READING: SLIGHT CHANCE OF DOZING
HOW LIKELY ARE YOU TO NOD OFF OR FALL ASLEEP WHILE LYING DOWN TO REST IN THE AFTERNOON WHEN CIRCUMSTANCES PERMIT: HIGH CHANCE OF DOZING
HOW LIKELY ARE YOU TO NOD OFF OR FALL ASLEEP IN A CAR, WHILE STOPPED FOR A FEW MINUTES IN TRAFFIC: WOULD NEVER DOZE
HOW LIKELY ARE YOU TO NOD OFF OR FALL ASLEEP WHILE SITTING AND TALKING TO SOMEONE: WOULD NEVER DOZE
HOW LIKELY ARE YOU TO NOD OFF OR FALL ASLEEP WHILE SITTING QUIETLY AFTER LUNCH WITHOUT ALCOHOL: WOULD NEVER DOZE
HOW LIKELY ARE YOU TO NOD OFF OR FALL ASLEEP WHILE SITTING INACTIVE IN A PUBLIC PLACE: WOULD NEVER DOZE
HOW LIKELY ARE YOU TO NOD OFF OR FALL ASLEEP WHILE WATCHING TV: MODERATE CHANCE OF DOZING
HOW LIKELY ARE YOU TO NOD OFF OR FALL ASLEEP WHEN YOU ARE A PASSENGER IN A CAR FOR AN HOUR WITHOUT A BREAK: HIGH CHANCE OF DOZING

## 2024-04-29 NOTE — PROGRESS NOTES
Outpatient Sleep Medicine Consultation:      Name: Alexis Piña MRN# [id number]   Age: 43 year old YOB: 1980     Date of Consultation: April 29, 2024  Consultation is requested by: No referring provider defined for this encounter. No ref. provider found  Primary care provider: Tray Monroe       Reason for Sleep Consult:     Alexis Piña is sent by No ref. provider found for a sleep consultation regarding snoring, poor sleep, daytime sleepiness.    Patient s Reason for visit  Alexis Piña main reason for visit: Excessive snoring, difficulty falling asleep, difficulty staying asleep, feeling tired during the day.  Patient states problem(s) started: Ongoing for years  Alexis Piña's goals for this visit: Better sleep           Assessment and Plan:     Summary Sleep Diagnoses and Recommendations:  (G47.33) RK (obstructive sleep apnea)  (primary encounter diagnosis)  Comment: Alexis presents with several sleep complaints, loud snoring, difficulty falling asleep and staying asleep, and daytime sleepiness. He had a home study with me in 2018 that was normal. He feels his symptoms are worsening and his wife now sleeps in another room because of his snoring. He is observed to have pauses in breathing when traveling with others. He has daytime sleepiness, napping for an hour about 4 times per week. His ESS was just in the normal range at 9/24. His BMI is 35, neck >40 cm (51 cm) and gender is male. This gives a STOP BANG score of 7. He does not have HTN. He was seen at the [facility] and had a home study that showed an AHI of 16 or 17/hr, per his recollection. He is more interested in a dental appliance than CPAP.   Plan: He signed a release of information so we can obtain a copy of his sleep study from [facility].   I gave him a sleep dental referral. I will contact him when that is received so that he knows he is cleared to pursue treatment.  He may contact me to schedule a home study to assess the  "dental appliance, should that be obtained before the follow up.  [facility], and that he may now pursue the dental appliance.    (F51.04) Chronic insomnia  Comment: He has always had difficulty falling asleep. Recently, he has had more difficulty getting back to sleep. He has been using diphenhydramine 12.5 mg to help him sleep. It does help him get to sleep, but it still takes an hour to fall asleep and 90 minutes to get back to sleep. He does get some restless legs with diphenhydramine. He only uses melatonin if he is desperate because he wakes with headaches.   He does feel he gets more alert when he gets up off of the couch and goes to bed at night. This suggests a psychophysiological component to his insomnia. He mentions he has always been a night owl, but his sleep is disrupted and light in the morning, in part because he hears his family up and about.  He has tried trazodone in the past and he felt it exacerbated his apnea symptoms. He did not notice that with lorazepam. He was interested in trying treatment again.   Plan: We reviewed concepts of circadian and homeostatic sleep drives. We also discussed stimulus control and sleep compression. He was advised to start with a sleep schedule of 12 AM to 7:30 AM. He was advised to avoid sleeping in or napping. We discussed dealing with stress by setting aside a designated \"worry time\" in the early evening and s/he was given resources for guided imagery/relaxation. We also talked about using phototherapy and melatonin to help advance his circadian rhythms.  I prescribed 50 mg trazodone again to see if his response is any different than he remembers. He will only take it sporadically and will work on the above recommendations and getting the apnea addressed. We may try doxepin if he does not respond well to trazodone.       Comorbid Diagnoses:  obesity (BMI 35), LONNY, prediabetes, GERD, gout.    Summary " Counseling:    Sleep Testing Reviewed  Obstructive Sleep Apnea Reviewed  Complications of Untreated Sleep Apnea Reviewed      Patient will follow up at my next available appointment (6 months).  Bennett Goltz, PA-C      Total time spent reviewing medical records, history and physical examination, review of previous testing and interpretation as well as documentation on this date:73 min    CC: No ref. provider found          History of Present Illness:     Alexis Piña presents with loud snoring, difficulty falling asleep and staying asleep, and daytime sleepiness. He had a home sleep study in 2018 that was normal. He has gained 5# since then. His snoring is much worse. He uses [employer] sporadically and the snore scores are worsening. He no longer sleeps in the same room as his wife. If he travels with friends, they comment on his snoring and he has been told that he stops breathing. He went to [facility] in January and they did not think there were any anatomical abnormalities. He did some sort of home testing and had an AHI of 16 or 17/hr. It was a nite owl device, which does measure peripheral arterial tone.    He naps for an hour about 4 times per week. He has always had difficulty falling asleep. Recently, he has had more difficulty getting back to sleep. He has been using diphenhydramine 12.5 mg to help him sleep. It does help him get to sleep, but it still takes an hour to fall asleep and 90 minutes to get back to sleep. He does get some restless legs with diphenhydramine. He only uses melatonin if he is desperate because he wakes with headaches.   He does feel he gets more alert when he gets up off of the couch and goes to bed at night.      Past Sleep Evaluations: HST 4/18/2018: Wt: 241#  AHI: 1.7/hr. Supine AHI: 2.5/hr.  Lateral AHI: 2.8/hr on left and 0.3/hr on right             Average SpO2: 93%.  Lowest Desaturation: 81% (although this appeared to be at the beginning and end of the study while starting and  ending the study)  Time Spent Below 89%: 0.4 minutes    Addendum: HST was obtained from [facility] for the date 2/13-2/14/24. His 3% AHI was 13.8/hr and 4% AHI was 5.9/hr. His O2 gilson was 90%.      SLEEP-WAKE SCHEDULE:     Work/School Days: Patient goes to school/work: Yes   Usually gets into bed at 12:00 AM  Takes patient about 1 hr. to fall asleep  Has trouble falling asleep 5 nights per week. He gets a racing mind (job, family)  Wakes up in the middle of the night 3 times.  Wakes up due to External stimuli (bed partner, pets, noise, etc);Use the bathroom;Anxiety  He has trouble falling back asleep 3 times a week.   It usually takes 90 minutes to get back to sleep (has used Calm and Headspace white noise nightly, occ sleep stories, less effective lately)  Patient is usually up at 8:15 His sleep is sometimes disrupted by family members being up and about.   Uses alarm: Yes    Weekends/Non-work Days/All Other Days:  Usually gets into bed at 12:00   Takes patient about 60 minutes to fall asleep  Patient is usually up at 8:15  Uses alarm: Yes    He is a bit of a night owl and struggled to get back on track after daylight savings.     Sleep Need  Patient gets  6 to 6.5 hours sleep on average   Patient thinks he needs about 8 hours sleep    Alexis C Efraingg prefers to sleep in this position(s): Side   Patient states they do the following activities in bed: Read;Use phone, computer, or tablet;Other (usually not at bedtime, infrequently in the middle of the night)    Naps  Patient takes a purposeful nap 4 times a week and naps are usually 60 minutes in duration  He feels better after a nap: Yes  He dozes off unintentionally 0 days per week  Patient has had a driving accident or near-miss due to sleepiness/drowsiness: No      SLEEP DISRUPTIONS:    Breathing/Snoring  Patient snores:Yes  Other people complain about his snoring: Yes  Patient has been told he stops breathing in his sleep:Yes  He has issues with the following:  Heartburn or reflux at night;Getting up to urinate more than once. no morning headaches. Takes omeprazole for silent reflux but will be sensitive to certain foods. Has woken with it going into his sinuses.  no nasal congestion at night.    Movement:  Patient gets pain, discomfort, with an urge to move:  No restless legs symptoms except when he takes diphenhydramine  It happens when he is resting:  No  It happens more at night:  No  Patient has been told he kicks his legs at night:  No     Behaviors in Sleep:  Alexis Piña has experienced the following behaviors while sleeping: Recurring Nightmares;Teeth grinding  He has experienced sudden muscle weakness during the day: No  Pt denies sleep talking, sleep walking, and dream enactment behavior. Pt denies sleep paralysis, hypnagogue and cataplexy.       Is there anything else you would like your sleep provider to know: No      CAFFEINE AND OTHER SUBSTANCES:    Patient consumes caffeinated beverages per day:  2  Last caffeine use is usually: 2:00  List of any prescribed or over the counter stimulants that patient takes: None  List of any prescribed or over the counter sleep medication patient takes: Benadryl  List of previous sleep medications that patient has tried: Trazodone, anri-anxiety meds (trazodone can be helpful but he feels it can suppress his breathing more), ativan for extreme situations which was helpful.  Patient drinks alcohol to help them sleep: Yes  Patient drinks alcohol near bedtime: Yes 1-2 drinks 2-3 times per week    Family History:  Patient has a family member been diagnosed with a sleep disorder: No        Social History:  He works as an  for [employer]. He lives with his wife and 2 children.     SCALES:    EPWORTH SLEEPINESS SCALE         4/29/2024     7:17 PM    [place] Sleepiness Scale ( RICHY Curry  [id number]<br>ESS - [place]/English - Final version - 21 Nov 07 - [facility] [employer].)   Sitting and reading Slight chance of dozing    Watching TV Moderate chance of dozing   Sitting, inactive in a public place (e.g. a theatre or a meeting) Would never doze   As a passenger in a car for an hour without a break High chance of dozing   Lying down to rest in the afternoon when circumstances permit High chance of dozing   Sitting and talking to someone Would never doze   Sitting quietly after a lunch without alcohol Would never doze   In a car, while stopped for a few minutes in traffic Would never doze   [place] Score (MC) 9   [place] Score (Sleep) 9         INSOMNIA SEVERITY INDEX (NEENA)          4/29/2024     7:07 PM   Insomnia Severity Index (NEENA)   Difficulty falling asleep 3   Difficulty staying asleep 3   Problems waking up too early 0   How SATISFIED/DISSATISFIED are you with your CURRENT sleep pattern? 4   How NOTICEABLE to others do you think your sleep problem is in terms of impairing the quality of your life? 3   How WORRIED/DISTRESSED are you about your current sleep problem? 3   To what extent do you consider your sleep problem to INTERFERE with your daily functioning (e.g. daytime fatigue, mood, ability to function at work/daily chores, concentration, memory, mood, etc.) CURRENTLY? 3   NEENA Total Score 19       Guidelines for Scoring/Interpretation:  Total score categories:  0-7 = No clinically significant insomnia   8-14 = Subthreshold insomnia   15-21 = Clinical insomnia (moderate severity)  22-28 = Clinical insomnia (severe)  Used via courtesy of www.Haozu.comth.va.gov with permission from Alexander Galvan PhD., [facility]      STOP BANG         4/30/2024    12:00 PM   STOP BANG Questionnaire (  2008, the American Society of Anesthesiologists, Inc. [employer]   Neck Cir (cm) Clinic: 51 cm   B/P Clinic: 146/99   BMI Clinic: 35.5         GAD7        7/17/2022     6:57 PM   LONNY-7    1. Feeling nervous, anxious, or on edge 2   2. Not being able to stop or control worrying 1   3. Worrying too much about  "different things 1   4. Trouble relaxing 2   5. Being so restless that it is hard to sit still 1   6. Becoming easily annoyed or irritable 3   7. Feeling afraid, as if something awful might happen 0   LONNY-7 Total Score 10         CAGE-AID         No data to display                CAGE-AID reprinted with permission from the [facility] Journal, VANITA Torres. and DORIS Batista, \"Conjoint screening questionnaires for alcohol and drug abuse\" [place] Medical Journal 94: 135-140, 1995.      PATIENT HEALTH QUESTIONNAIRE-9 (PHQ - 9)        7/17/2022     6:57 PM   PHQ-9 ([employer])   1.  Little interest or pleasure in doing things 1   2.  Feeling down, depressed, or hopeless 1   3.  Trouble falling or staying asleep, or sleeping too much 3   4.  Feeling tired or having little energy 1   5.  Poor appetite or overeating 0   6.  Feeling bad about yourself - or that you are a failure or have let yourself or your family down 1   7.  Trouble concentrating on things, such as reading the newspaper or watching television 0   8.  Moving or speaking so slowly that other people could have noticed. Or the opposite - being so fidgety or restless that you have been moving around a lot more than usual 0   9.  Thoughts that you would be better off dead, or of hurting yourself in some way 0   PHQ-9 Total Score 7   6.  Feeling bad about yourself 1   7.  Trouble concentrating 0   8.  Moving slowly or restless 0   9.  Suicidal or self-harm thoughts 0   1.  Little interest or pleasure in doing things Several days   2.  Feeling down, depressed, or hopeless Several days   3.  Trouble falling or staying asleep, or sleeping too much Nearly every day   4.  Feeling tired or having little energy Several days   5.  Poor appetite or overeating Not at all   6.  Feeling bad about yourself Several days   7.  Trouble concentrating Not at all   8.  Moving slowly or restless Not at all   9.  Suicidal or self-harm thoughts Not at all   PHQ-9 via [employer] TOTAL " SCORE-----> 7 (Mild depression)   Difficulty at work, home, or with people Somewhat difficult       Developed by Steven Spear, Vickie Callejas, Dino Olivas and colleagues, with an educational blaire from [employer]. No permission required to reproduce, translate, display or distribute.        Allergies:    Allergies   Allergen Reactions    Amoxicillin [Penicillins] Hives    Colchicine GI Disturbance    Doxy [Doxycycline Hyclate] Hives    Indocin [Indomethacin] Other (See Comments)     Severe headaches       Medications:    Current Outpatient Medications   Medication Sig Dispense Refill    allopurinol (ZYLOPRIM) 300 MG tablet TAKE 1 TABLET BY MOUTH EVERY DAY 90 tablet 3    mometasone (ELOCON) 0.1 % external ointment Apply sparingly to affected area. 45 g 6    omeprazole (PRILOSEC) 20 MG DR capsule TAKE 1 CAPSULE BY MOUTH EVERY DAY AS NEEDED 90 capsule 3    Semaglutide-Weight Management (WEGOVY) 0.25 MG/0.5ML pen Inject 0.25 mg Subcutaneous once a week 2 mL 0    Semaglutide-Weight Management (WEGOVY) 0.5 MG/0.5ML pen Inject 0.5 mg Subcutaneous once a week 2 mL 0    sertraline (ZOLOFT) 50 MG tablet Take 1 tablet (50 mg) by mouth daily 90 tablet 3    SUMAtriptan (IMITREX) 50 MG tablet Take 1 tablet (50 mg) by mouth at onset of headache for migraine May repeat dose in 2 hours.  Do not exceed 200 mg in 24 hours 18 tablet 6    tirzepatide-Weight Management (ZEPBOUND) 2.5 MG/0.5ML prefilled pen Inject 0.5 mLs (2.5 mg) Subcutaneous every 7 days for 28 days, THEN 1 mL (5 mg) every 7 days for 56 days. 6 mL 1       Problem List:  Patient Active Problem List    Diagnosis Date Noted    Chronic gout without tophus, unspecified cause, unspecified site 09/18/2019     Priority: Medium    LONNY (generalized anxiety disorder) 09/19/2016     Priority: Medium    Obesity 09/03/2014     Priority: Medium     Problem list name updated by automated process. Provider to review      Prediabetes 12/19/2011     Priority: Medium     [place] 03/08/2011     Priority: Medium    HYPERLIPIDEMIA LDL GOAL <160 02/10/2010     Priority: Medium        Past Medical/Surgical History:  Past Medical History:   Diagnosis Date    Depressive disorder     Pure hypercholesterolemia     Seborrhea     Tobacco use disorder     Vitamin D deficiency 12/22/2011     Past Surgical History:   Procedure Laterality Date    NO HISTORY OF SURGERY         Social History:  Social History     Socioeconomic History    Marital status:      Spouse name: Not on file    Number of children: Not on file    Years of education: Not on file    Highest education level: Not on file   Occupational History    Not on file   Tobacco Use    Smoking status: Former    Smokeless tobacco: Never    Tobacco comments:     quit 12/03/2005   Substance and Sexual Activity    Alcohol use: Yes     Alcohol/week: 33.3 standard drinks of alcohol     Types: 33 Standard drinks or equivalent per week     Comment: 2-3 times per week, 1-2 drinks    Drug use: No    Sexual activity: Yes     Partners: Female   Other Topics Concern    Parent/sibling w/ CABG, MI or angioplasty before 65F 55M? No   Social History Narrative    Not on file     Social Determinants of Health     Financial Resource Strain: Low Risk  (4/16/2024)    Financial Resource Strain     Within the past 12 months, have you or your family members you live with been unable to get utilities (heat, electricity) when it was really needed?: No   Food Insecurity: Low Risk  (4/16/2024)    Food Insecurity     Within the past 12 months, did you worry that your food would run out before you got money to buy more?: No     Within the past 12 months, did the food you bought just not last and you didn t have money to get more?: No   Transportation Needs: Low Risk  (4/16/2024)    Transportation Needs     Within the past 12 months, has lack of transportation kept you from medical appointments, getting your medicines, non-medical meetings or appointments, work, or  from getting things that you need?: No   Physical Activity: Sufficiently Active (4/16/2024)    Exercise Vital Sign     Days of Exercise per Week: 6 days     Minutes of Exercise per Session: 30 min   Stress: Stress Concern Present (4/16/2024)    [place] [employer] - Occupational Stress Questionnaire     Feeling of Stress : Very much   Social Connections: Unknown (4/16/2024)    Social Connection and Isolation Panel [NHANES]     Frequency of Communication with Friends and Family: Not on file     Frequency of Social Gatherings with Friends and Family: Twice a week     Attends Mormonism Services: Not on file     Active Member of Clubs or Organizations: Not on file     Attends Club or Organization Meetings: Not on file     Marital Status: Not on file   Interpersonal Safety: Low Risk  (4/17/2024)    Interpersonal Safety     Do you feel physically and emotionally safe where you currently live?: Yes     Within the past 12 months, have you been hit, slapped, kicked or otherwise physically hurt by someone?: No     Within the past 12 months, have you been humiliated or emotionally abused in other ways by your partner or ex-partner?: No   Housing Stability: Low Risk  (4/16/2024)    Housing Stability     Do you have housing? : Yes     Are you worried about losing your housing?: No       Family History:  Family History   Problem Relation Age of Onset    Neurologic Disorder Mother         MS    Cancer Father         father diagnose with bladder cancer at age 61 yrs old    Coronary Artery Disease Father     Diabetes Paternal Grandmother     C.A.D. No family hx of     Hypertension No family hx of     Cancer - colorectal No family hx of     Prostate Cancer No family hx of        Review of Systems:  A complete review of systems reviewed by me is negative with the exeption of what has been mentioned in the history of present illness.        Physical Examination:  Vitals: BP (!) 146/99   Pulse 70   Ht 1.775 m (5'  "9.88\")   Wt 111.9 kg (246 lb 9.6 oz)   SpO2 97%   BMI 35.50 kg/m    BMI= Body mass index is 35.5 kg/m .    Neck Cir (cm): 51 cm      GENERAL APPEARANCE: healthy, alert, no distress, and cooperative  EYES: Eyes grossly normal to inspection, PERRL, conjunctivae and sclerae normal, and lids and lashes normal  HENT: oropharynx crowded, uvula elongated, soft palate dependent, and tongue base enlarged  NECK: no adenopathy, no asymmetry, masses, or scars, thyroid normal to palpation, and trachea midline and normal to palpation  RESP: lungs clear to auscultation - no rales, rhonchi or wheezes  CV: regular rates and rhythm, no murmur, click or rub, and no irregular beats  LYMPHATICS: no cervical adenopathy  MS: extremities normal- no gross deformities noted  NEURO: Normal strength and tone, mentation intact, and speech normal  Mallampati Class: IV.  Tonsillar Stage: 1  hidden by pillars.         Data: All pertinent previous laboratory data reviewed     Recent Labs   Lab Test 04/17/24  1144 09/08/22  1003    137   POTASSIUM 4.1 4.2   CHLORIDE 102 104   CO2 22 28   ANIONGAP 15 5   GLC 99 104*   BUN 11.7 15   CR 0.90 0.89   LILA 9.6 9.9       Recent Labs   Lab Test 04/17/24  1144   WBC 6.2   RBC 5.16   HGB 16.2   HCT 45.7   MCV 89   MCH 31.4   MCHC 35.4   RDW 12.8          Recent Labs   Lab Test 04/17/24  1144   PROTTOTAL 7.8   ALBUMIN 4.8   BILITOTAL 0.6   ALKPHOS 79   AST 31   ALT 45       TSH (mU/L)   Date Value   01/16/2012 2.15       No results found for: \"UAMP\", \"UBARB\", \"BENZODIAZEUR\", \"UCANN\", \"UCOC\", \"OPIT\", \"UPCP\"    No results found for: \"IRONSAT\", \"[id number]\", \"AMINTA\"    No results found for: \"PH\", \"PHARTERIAL\", \"PO2\", \"PC2RWIEHJRK\", \"SAT\", \"PCO2\", \"HCO3\", \"BASEEXCESS\", \"OVIDIO\", \"BEB\"    @LABRCNTIPR(phv:4,pco2v:4,po2v:4,hco3v:4,macirna:4,o2per:4)@    Echocardiology: No results found for this or any previous visit (from the past 4320 hour(s)).    Chest x-ray: No results found for this or any previous visit from " "the past 365 days.      Chest CT: No results found for this or any previous visit from the past 365 days.      PFT: Most Recent [facility] Pulmonary Function Testing    No results found for: \"20001\"        Bennett Ezra Goltz, PA-C, APOLINAR 4/29/2024          "

## 2024-04-30 ENCOUNTER — OFFICE VISIT (OUTPATIENT)
Dept: SLEEP MEDICINE | Facility: CLINIC | Age: 44
End: 2024-04-30
Payer: COMMERCIAL

## 2024-04-30 VITALS
WEIGHT: 246.6 LBS | OXYGEN SATURATION: 97 % | DIASTOLIC BLOOD PRESSURE: 96 MMHG | SYSTOLIC BLOOD PRESSURE: 131 MMHG | HEART RATE: 70 BPM | HEIGHT: 70 IN | BODY MASS INDEX: 35.3 KG/M2

## 2024-04-30 DIAGNOSIS — G47.33 OSA (OBSTRUCTIVE SLEEP APNEA): Primary | ICD-10-CM

## 2024-04-30 DIAGNOSIS — F51.04 CHRONIC INSOMNIA: ICD-10-CM

## 2024-04-30 PROCEDURE — 99205 OFFICE O/P NEW HI 60 MIN: CPT | Performed by: PHYSICIAN ASSISTANT

## 2024-04-30 RX ORDER — TRAZODONE HYDROCHLORIDE 50 MG/1
50 TABLET, FILM COATED ORAL AT BEDTIME
Qty: 30 TABLET | Refills: 0 | Status: SHIPPED | OUTPATIENT
Start: 2024-04-30 | End: 2024-05-29

## 2024-04-30 NOTE — PATIENT INSTRUCTIONS
[facility]  Cognitive Behavioral Therapy for Insomnia       Core Sleep Training Module    Now that you understand a bit more about how sleep works and what causes insomnia, you are ready to begin CBT-I Core Sleep Training.  This process involves five key strategies that will:    Strengthen your sleep drive and circadian sleep rhythm  Strengthen the link between your bed and sleep    It will be important that you continue to keep track of your sleep using your Insomnia  Cat or your paper sleep diary.      Core Sleep Strategies    Much like physical activity and healthy eating strengthens our body, studies show that the following Core strategies are key to achieving stronger, healthier sleep. The focus is on quality of sleep (not quantity) and improving how you feel during the day.     Reduce Your Time in Bed    Spending extra time in bed trying to get more sleep can cause more sleep disruption and weaken sleep efficiency. Sleep efficiency is simply the percentage of time a person spends asleep while in bed. Normal sleep efficiency is thought to be 85% or greater.  By reducing your time in bed, you will be awake longer during the day leading to quicker and deeper sleep at night. This strategy does not reduce the amount of sleep you get, just the time you are awake in bed.                                             Your provider has recommended the following initial sleep schedule determined by your  estimate of average sleep time over the past two weeks plus 30 minutes.  It also consider the best time for you to sleep.     Your Sleep Schedule Prescription                       Total Time in Bed:  7.5 hours                     Bedtime:  No earlier than 12 AM                      Wake-up Time:  Out of bed every day by 7:30 AM      Don't go to bed until you feel sleepy (not tired or fatigued)     This helps increase your sleep drive by keeping you awake longer.  If you go to bed when you're not sleepy, it  gives your brain the wrong message and can lead to frustration.     If you feel sleepy before your prescribed bedtime, find activities that can help you stay awake until it is time for you to go to bed. Even brief naps in the evening can be very disruptive of sleep at night.      Don't stay in bed unless you are sleeping      If you are unable to fall asleep or return to sleep after about 30 minutes, get out of bed. This helps train your brain that the bed is for sleep. It is harmful to your sleep when you are worried or frustrated in bed. Do not read, eat, worry, think about sleep, use mobile phones or tablets, or watch TV in bed. Do not watch the clock during the night.     Go to another room and do something relaxing. Plan things you can do when you get out of bed. Avoid use of mobile devices or computers when out of bed.    Return to bed only when you feel sleepy again.  Repeat as often as needed throughout the night.      Get out of bed at the same time every day    Getting up at the same time helps set your biological clock. It is important to stick to your wake time no matter how much sleep you got the night before or how you feel in the morning.    Varying your wake can have the same effect as jet lag.  It disrupts your biological clock and makes you feel more tired and exhausted.  Trying to  catch up  on sleep on the weekends only makes things worse and sets you up for a cycle of poor sleep the following weekdays.      Make sure you set an alarm and keep it away from the bed to prevent looking at the clock during the night.       Avoid daytime napping    Avoid daytime napping if possible. Napping partially fulfills your need for sleep and weakens your sleep drive at night.    However, if you find yourself sleepy (not just tired) you can take a brief 15-30-minute nap during the day if needed.  Naps within 7-8 hours of your prescribed wake time are less likely to affect your sleep the coming night.  Sleepy  people make more mistakes and may hurt themselves or others. Therefore, safety trumps all other sleep prescriptions.  Never drive or operate equipment if drowsy or sleepy.     Changing Your Sleep Schedule Prescription    After 2 weeks, you can adjust your sleep schedule prescription based on how well you are sleeping. Use the following guidelines to change your prescribed time in bed based on information from your Insomnia  eduardo or paper sleep diary.          Increase Time in Bed by 15 Minutes IF:    Your Insomnia  eduardo progress tracker estimates that your sleep efficiency has been greater than 90% over the past week (press Progress icon on the home screen and swipe left for this value).    OR you are falling asleep in less than 30 minutes AND awake less than 30 minutes during the night.              Decrease Time in Bed by 15 Minutes IF:    Your Insomnia  eduardo sleep diary progress tracker estimates that your sleep efficiency has been less than 80% over the past week (press Progress icon on the home screen and swipe left for this value).    OR it is taking longer than 30 minutes to fall asleep OR you are awake more than 30 minutes during the night.      DO NOT decrease your sleep schedule below 6.5  hours     Change is Challenging    Research shows that making significant changes in sleep habits improves sleep quality and how you feel. Improvement takes time and is not always steady. Change is challenging and can be stressful.  This is especially true if old habits - like spending more time in bed -- were a way to avoid worry about getting enough sleep.      Deal with your worries before bedtime    Set aside a worry time around dinner time for 10-15 minutes. Write down the    things that are on your mind. Plan time in the coming days to address those    issues. Brainstorm ideas on how you will deal with them. Try to identify issues    that are out of your control, and try to let those issues  go.    Instructions for treating Delayed Sleep Phase Syndrome:    Delayed Sleep Phase Syndrome (DSPS) means that your body's internal timing is set late compared to the 24 hour day. Therefore, it is often difficult to get up on time for work in the morning and sometimes difficult to fall asleep on time, in order to get enough sleep. People with DSPS often tend to like to stay up late on weekends and sleep in until between 10 AM and noon, sometimes even later.  This is actually a bad habit that will perpetuate the problem. It reinforces your body's tendency to be on that later schedule. Keeping the same wake time on all days (or as close as possible) will help your body maintain any advancement you make to your circadian rhythms.    You should go to bed when you are sleepy and ready to sleep. During this entire process, you should not engage in activities that may make it worse, such as watching TV in bed, leaving the TV on all night, drinking any caffeine 6 hours before bed or exercising 1-2 hours before bed.     Start taking Melatonin, 0.5-1 mg tablet 3-5 hours before the time that you fall asleep on average (not your desired bedtime or time that you get in bed, but the time you normally fall asleep on your own).     Upon awakening, get exposure to sun-light for about 30-45 minutes. You do not need to look at the sun, in fact, this is dangerous. Reading the paper with the sun shining on you is adequate.  Alternatively, you may use a Seasonal Affective Disorder Lamp (intensity 10,000 Lux) instead of the sun. The lamp should be positioned 1-2 arms lengths away from you. They lamps are sold at Home Medical Companies such as [employer] or [employer]. A prescription can be written to get insurance coverage in some cases. They are also sold on [employer]. Consider the [place]ilux Happy Light Lucent.    Using the light and melatonin should help march your internal clock (known as your circadian rhythms)  gradually earlier. As your bedtime advances, remember to take your melatonin earlier, keeping it 5 hours before your fall asleep time.    Avoid naps and sleeping in because sleeping during the day will delay your body's clock and you will have to start from scratch.     More information about light therapy:  If the cost of any light box is too much, you can also purchase a compact fluorescent all spectrum light bulb at a local hardware store for about $8.  The most commonly available bulb is 1400 lumen.  You would need two of these positioned within 1 meter of yourself to be equivalent to 2,500 lux.  The bulbs can be placed in a standard light fixture.  Additionally, they can be placed in a mountable fixture that is used in greenhouses.  Mountable fixtures are also available at hardware stores for about $9.  Do not look directly at the light.  If you have any concerns regarding the safety of bright light therapy for you, it is recommended that you consult an ophthalmologist before using a light box.  If you have a condition that makes your eyes very sensitive to light, macular degeneration, a family history of such problems, or diabetic changes to your eyes, consult an ophthalmologist before using a light box. If you have anxiety disorder and have an increase in anxiety discontinue use.

## 2024-04-30 NOTE — NURSING NOTE
"Chief Complaint   Patient presents with    Sleep Problem     Snoring, trouble going to sleep and staying asleep, stop breathing, wakes self       Initial BP (!) 146/99   Pulse 70   Ht 1.775 m (5' 9.88\")   Wt 111.9 kg (246 lb 9.6 oz)   SpO2 97%   BMI 35.50 kg/m   Estimated body mass index is 35.5 kg/m  as calculated from the following:    Height as of this encounter: 1.775 m (5' 9.88\").    Weight as of this encounter: 111.9 kg (246 lb 9.6 oz).    Medication Reconciliation: complete  ESS 9  Neck circumference:  51 centimeters.  Joel Callejas MA       "

## 2024-05-13 ENCOUNTER — MYC MEDICAL ADVICE (OUTPATIENT)
Dept: PEDIATRICS | Facility: CLINIC | Age: 44
End: 2024-05-13

## 2024-05-13 ENCOUNTER — VIRTUAL VISIT (OUTPATIENT)
Dept: PEDIATRICS | Facility: CLINIC | Age: 44
End: 2024-05-13
Payer: COMMERCIAL

## 2024-05-13 DIAGNOSIS — K59.00 CONSTIPATION, UNSPECIFIED CONSTIPATION TYPE: ICD-10-CM

## 2024-05-13 DIAGNOSIS — R11.0 NAUSEA: Primary | ICD-10-CM

## 2024-05-13 PROCEDURE — 99213 OFFICE O/P EST LOW 20 MIN: CPT | Mod: 95 | Performed by: INTERNAL MEDICINE

## 2024-05-13 RX ORDER — ONDANSETRON 4 MG/1
4 TABLET, ORALLY DISINTEGRATING ORAL EVERY 6 HOURS PRN
Qty: 30 TABLET | Refills: 1 | Status: SHIPPED | OUTPATIENT
Start: 2024-05-13

## 2024-05-13 NOTE — PROGRESS NOTES
"Alexis is a 43 year old who is being evaluated via a billable video visit.          Assessment & Plan     (R11.0) Nausea  (primary encounter diagnosis)  Comment: secondary to wegovy.  Has eliminated caffeine, alcohol/ increasing water intake.  Start zofran prn  Plan: ondansetron (ZOFRAN ODT) 4 MG ODT tab          (K59.00) Constipation, unspecified constipation type  Comment:   Plan: secondary to wegovy.  Start miralax daily as needed            BMI  Estimated body mass index is 35.5 kg/m  as calculated from the following:    Height as of 4/30/24: 1.775 m (5' 9.88\").    Weight as of 4/30/24: 111.9 kg (246 lb 9.6 oz).             Subjective   Alexis is a 43 year old, presenting for the following health issues:      HPI     Wegovy follow-up  0.25mg dose-2 weeks  Mild nausea, constipation.    Patient Active Problem List   Diagnosis    HYPERLIPIDEMIA LDL GOAL <160    Lumbago    Prediabetes    Obesity    LONNY (generalized anxiety disorder)    Chronic gout without tophus, unspecified cause, unspecified site     Current Outpatient Medications   Medication Sig Dispense Refill    ondansetron (ZOFRAN ODT) 4 MG ODT tab Take 1 tablet (4 mg) by mouth every 6 hours as needed for nausea 30 tablet 1    allopurinol (ZYLOPRIM) 300 MG tablet TAKE 1 TABLET BY MOUTH EVERY DAY 90 tablet 3    mometasone (ELOCON) 0.1 % external ointment Apply sparingly to affected area. 45 g 6    omeprazole (PRILOSEC) 20 MG DR capsule TAKE 1 CAPSULE BY MOUTH EVERY DAY AS NEEDED 90 capsule 3    Semaglutide-Weight Management (WEGOVY) 0.25 MG/0.5ML pen Inject 0.25 mg Subcutaneous once a week 2 mL 0    Semaglutide-Weight Management (WEGOVY) 0.5 MG/0.5ML pen Inject 0.5 mg Subcutaneous once a week 2 mL 0    sertraline (ZOLOFT) 50 MG tablet Take 1 tablet (50 mg) by mouth daily 90 tablet 3    SUMAtriptan (IMITREX) 50 MG tablet Take 1 tablet (50 mg) by mouth at onset of headache for migraine May repeat dose in 2 hours.  Do not exceed 200 mg in 24 hours 18 tablet 6    " traZODone (DESYREL) 50 MG tablet Take 1 tablet (50 mg) by mouth at bedtime 30 tablet 0                  Objective           Vitals:  No vitals were obtained today due to virtual visit.    Physical Exam   GENERAL: alert and no distress  EYES: Eyes grossly normal to inspection.  No discharge or erythema, or obvious scleral/conjunctival abnormalities.  RESP: No audible wheeze, cough, or visible cyanosis.    SKIN: Visible skin clear. No significant rash, abnormal pigmentation or lesions.  NEURO: Cranial nerves grossly intact.  Mentation and speech appropriate for age.  PSYCH: Appropriate affect, tone, and pace of words          Video-Visit Details    Type of service:  Video Visit   Originating Location (pt. Location): Home    Distant Location (provider location):  On-site  Platform used for Video Visit: hiogi  Video start: 5:25pm  Video end: 5:32pm  Signed Electronically by: Tray Monroe MD

## 2024-05-13 NOTE — TELEPHONE ENCOUNTER
Pt agreed to send an E-Visit request.    Hernan SCHNEIDER  Clinic RN  [facility]

## 2024-05-14 ENCOUNTER — TRANSFERRED RECORDS (OUTPATIENT)
Dept: HEALTH INFORMATION MANAGEMENT | Facility: CLINIC | Age: 44
End: 2024-05-14
Payer: COMMERCIAL

## 2024-05-29 DIAGNOSIS — F51.04 CHRONIC INSOMNIA: ICD-10-CM

## 2024-05-29 RX ORDER — TRAZODONE HYDROCHLORIDE 50 MG/1
50 TABLET, FILM COATED ORAL AT BEDTIME
Qty: 90 TABLET | Refills: 1 | Status: SHIPPED | OUTPATIENT
Start: 2024-05-29

## 2024-06-06 ENCOUNTER — MYC MEDICAL ADVICE (OUTPATIENT)
Dept: PEDIATRICS | Facility: CLINIC | Age: 44
End: 2024-06-06
Payer: COMMERCIAL

## 2024-06-06 DIAGNOSIS — E66.9 OBESITY, UNSPECIFIED CLASSIFICATION, UNSPECIFIED OBESITY TYPE, UNSPECIFIED WHETHER SERIOUS COMORBIDITY PRESENT: Primary | ICD-10-CM

## 2024-06-06 NOTE — TELEPHONE ENCOUNTER
See patient's MyChart message   - Patient states that he is tolerating the Semaglutide-Weight Management (WEGOVY) 0.5 MG/0.5ML pen dose well   - Patient is requesting to increase the dose for his next fill   - Patient's preferred pharmacy: [facility] [address]     Dr. Monroe, please review and order as appropriate.     Linda MICHAELS RN   [facility]

## 2024-07-15 ENCOUNTER — MYC MEDICAL ADVICE (OUTPATIENT)
Dept: PEDIATRICS | Facility: CLINIC | Age: 44
End: 2024-07-15
Payer: COMMERCIAL

## 2024-07-15 DIAGNOSIS — R73.03 PREDIABETES: Primary | ICD-10-CM

## 2024-07-15 DIAGNOSIS — E66.9 OBESITY, UNSPECIFIED CLASSIFICATION, UNSPECIFIED OBESITY TYPE, UNSPECIFIED WHETHER SERIOUS COMORBIDITY PRESENT: ICD-10-CM

## 2024-07-15 NOTE — TELEPHONE ENCOUNTER
See patient's MyChart message   - Patient states that he completed the dose of his Wegovy and tolerated well   - Patient requesting the next increased dose be sent to the [facility]     Semaglutide-Weight Management (WEGOVY) 1 MG/0.5ML pen 2 mL 0 6/6/2024 -- --   Sig - Route: Inject 1 mg Subcutaneous once a week - Subcutaneous     Per UpToDate:     SUBQ: Initiate and adjust dose using the following schedule: In patients who do not tolerate a dosage increase, consider delaying the increase for an additional 4 weeks:  Week 1 through week 4 : 0.25 mg once weekly.  Week 5 through week 8: 0.5 mg once weekly.  Week 9 through week 12: 1 mg once weekly.  Week 13 through week 16: 1.7 mg once weekly.  Week 17 and thereafter (maintenance dosage): 2.4 mg once weekly (preferred regimen); if not tolerated, may use an alternative maintenance dosage of 1.7 mg once weekly.    - Pended Wegovy 1.7 mg once weekly with the [facility]     Dr. Monroe, please review and order as appropriate.     Linda MICHAELS RN   [facility]

## 2024-08-16 NOTE — TELEPHONE ENCOUNTER
See patient's MyChart message   - Patient states that he completed the dose of his Wegovy and tolerated well   - Patient requesting the next increased dose be sent to the [facility]      Semaglutide-Weight Management (WEGOVY) 1.7 MG/0.75ML pen 3 mL 1 7/15/2024 -- No   Sig - Route: Inject 1.7 mg subcutaneously once a week - Subcutaneous      Per UpToDate:      SUBQ: Initiate and adjust dose using the following schedule: In patients who do not tolerate a dosage increase, consider delaying the increase for an additional 4 weeks:  Week 1 through week 4 : 0.25 mg once weekly.  Week 5 through week 8: 0.5 mg once weekly.  Week 9 through week 12: 1 mg once weekly.  Week 13 through week 16: 1.7 mg once weekly.  Week 17 and thereafter (maintenance dosage): 2.4 mg once weekly (preferred regimen); if not tolerated, may use an alternative maintenance dosage of 1.7 mg once weekly.     - Pended Wegovy 2.4 mg once weekly with the [facility]      Dr. Monroe, please review and order as appropriate.     Linda MICHAELS RN   [facility] [place]

## 2024-09-23 ENCOUNTER — TRANSFERRED RECORDS (OUTPATIENT)
Dept: HEALTH INFORMATION MANAGEMENT | Facility: CLINIC | Age: 44
End: 2024-09-23
Payer: COMMERCIAL

## 2024-10-02 ENCOUNTER — TRANSFERRED RECORDS (OUTPATIENT)
Dept: HEALTH INFORMATION MANAGEMENT | Facility: CLINIC | Age: 44
End: 2024-10-02
Payer: COMMERCIAL

## 2024-10-11 ENCOUNTER — TELEPHONE (OUTPATIENT)
Dept: PEDIATRICS | Facility: CLINIC | Age: 44
End: 2024-10-11
Payer: COMMERCIAL

## 2024-10-11 NOTE — TELEPHONE ENCOUNTER
Prior Authorization Retail Medication Request    Medication/Dose: WEGOVY 2.4MG/0.75ML  Diagnosis and ICD code (if different than what is on RX):    New/renewal/insurance change PA/secondary ins. PA:  Previously Tried and Failed:    Rationale:      Insurance   Primary:  [employer] [place] [employer]  Insurance ID:  [id number]    Secondary (if applicable)    Insurance ID    Pharmacy Information (if different than what is on RX)  [facility]  Phone:  [phone]  Fax:  [phone]    Clinic Information  Preferred routing pool for dept communication:   THANK YOU!   HAVE A GREAT DAY!  AVNI GONZALEZ PHARMACY TECHNICIAN  [facility]

## 2024-10-12 DIAGNOSIS — M1A.9XX0 CHRONIC GOUT WITHOUT TOPHUS, UNSPECIFIED CAUSE, UNSPECIFIED SITE: ICD-10-CM

## 2024-10-14 RX ORDER — ALLOPURINOL 300 MG/1
1 TABLET ORAL DAILY
Qty: 90 TABLET | Refills: 0 | Status: SHIPPED | OUTPATIENT
Start: 2024-10-14

## 2024-10-15 NOTE — TELEPHONE ENCOUNTER
Prior Authorization Approval    Authorization Effective Date: 9/15/2024  Authorization Expiration Date: 10/15/2025  Medication: WEGOVY 2.4MG/0.75ML  Approved Dose/Quantity:   Reference #:     Insurance Company: [employer] - Phone [phone] Fax [phone]  Expected CoPay:       CoPay Card Available:      Foundation Assistance Needed:    Which Pharmacy is filling the prescription (Not needed for infusion/clinic administered): [facility] - [address]   Pharmacy Notified:  yes  Patient Notified:  yes- Pharmacy will contact patient when ready to /ship         [employer] [place] [employer]

## 2024-10-15 NOTE — TELEPHONE ENCOUNTER
Central Prior Authorization Team   Phone: [phone]    PA Initiation    Medication: WEGOVY 2.4MG/0.75ML  Insurance Company: [employer] - Phone [phone] Fax [phone]  Pharmacy Filling the Rx: [facility] - [address]   Filling Pharmacy Phone: [phone]  Filling Pharmacy Fax:    Start Date: 10/15/2024

## 2024-11-27 DIAGNOSIS — F51.04 CHRONIC INSOMNIA: ICD-10-CM

## 2024-11-27 NOTE — TELEPHONE ENCOUNTER
Pending Prescriptions:                       Disp   Refills    traZODone (DESYREL) 50 MG tablet          90 tab*1            Sig: Take 1 tablet (50 mg) by mouth at bedtime.          Last Written Prescription Date:  5/29/24  Last Fill Quantity: 90   # refills: 1  Last Office Visit: 11/22/24  Future Office visit: NA        Routing refill request to provider for review/approval because:  Drug not on the [facility] or [facility] refill protocol or controlled substance

## 2024-12-02 RX ORDER — TRAZODONE HYDROCHLORIDE 50 MG/1
50 TABLET, FILM COATED ORAL AT BEDTIME
Qty: 90 TABLET | Refills: 1 | Status: SHIPPED | OUTPATIENT
Start: 2024-12-02

## 2025-01-11 DIAGNOSIS — F41.1 GAD (GENERALIZED ANXIETY DISORDER): ICD-10-CM

## 2025-01-12 DIAGNOSIS — M1A.9XX0 CHRONIC GOUT WITHOUT TOPHUS, UNSPECIFIED CAUSE, UNSPECIFIED SITE: ICD-10-CM

## 2025-01-13 RX ORDER — ALLOPURINOL 300 MG/1
1 TABLET ORAL DAILY
Qty: 90 TABLET | Refills: 3 | Status: SHIPPED | OUTPATIENT
Start: 2025-01-13

## 2025-01-14 ENCOUNTER — MYC REFILL (OUTPATIENT)
Dept: PEDIATRICS | Facility: CLINIC | Age: 45
End: 2025-01-14
Payer: COMMERCIAL

## 2025-01-14 DIAGNOSIS — R51.9 INTRACTABLE HEADACHE, UNSPECIFIED CHRONICITY PATTERN, UNSPECIFIED HEADACHE TYPE: ICD-10-CM

## 2025-01-14 RX ORDER — SUMATRIPTAN 50 MG/1
50 TABLET, FILM COATED ORAL
Qty: 18 TABLET | Refills: 11 | Status: SHIPPED | OUTPATIENT
Start: 2025-01-14

## 2025-02-17 NOTE — PROGRESS NOTES
Need more information.  Please schedule a virtual video visit with me anytime this week, ok to use any YADIEL or other held spot

## 2025-02-18 NOTE — PROGRESS NOTES
Called patient and scheduled appointment as requested, closing encounter at this time.     Trista Castillo, CMA

## 2025-02-20 ENCOUNTER — VIRTUAL VISIT (OUTPATIENT)
Dept: PEDIATRICS | Facility: CLINIC | Age: 45
End: 2025-02-20
Payer: COMMERCIAL

## 2025-02-20 DIAGNOSIS — E66.9 OBESITY, UNSPECIFIED CLASS, UNSPECIFIED OBESITY TYPE, UNSPECIFIED WHETHER SERIOUS COMORBIDITY PRESENT: Primary | ICD-10-CM

## 2025-02-20 NOTE — PROGRESS NOTES
"Alexis is a 44 year old who is being evaluated via a billable video visit.          Assessment & Plan     (E66.9) Obesity, unspecified class, unspecified obesity type, unspecified whether serious comorbidity present  (primary encounter diagnosis)  Comment:   Plan:    Wegovy follow-up.  Successful weight loss over the past several months with continued appetite suppression on Wegovy.  Did not recommend switching to Zepbound-would require ramping up from a lower dose and likely some weight regain over those several months.  Instead recommended reducing daily calorie intake a bit further and continuing exercise regimen.  Suspect plateau may be a compensatory response and as long as he continues with calorie restriction and exercise, would expect him to start losing weight again in the next few months.      BMI  Estimated body mass index is 35.42 kg/m  as calculated from the following:    Height as of 11/22/24: 1.775 m (5' 9.88\").    Weight as of 11/22/24: 111.6 kg (246 lb).             Subjective   Alexis is a 44 year old, presenting for the following health issues:    HPI     Wegovy follow-up.  Has continued Wegovy since spring 2024.  Currently takes Wegovy 2.4 mg weekly-total of 35 pound weight loss.  Estimated daily calories 1700.  Exercises 4-5 days/week at times up to 2 hours per session.  Patient has concerns that weight has plateaued since late November-the past 2-3 months.  Questions about whether to switch to Zepbound    Patient Active Problem List   Diagnosis    HYPERLIPIDEMIA LDL GOAL <160    Lumbago    Prediabetes    Obesity    LONNY (generalized anxiety disorder)    Chronic gout without tophus, unspecified cause, unspecified site     Current Outpatient Medications   Medication Sig Dispense Refill    allopurinol (ZYLOPRIM) 300 MG tablet TAKE 1 TABLET BY MOUTH EVERY DAY 90 tablet 3    mometasone (ELOCON) 0.1 % external ointment Apply sparingly to affected area. 45 g 6    omeprazole (PRILOSEC) 20 MG DR capsule TAKE " 1 CAPSULE BY MOUTH EVERY DAY AS NEEDED 90 capsule 3    ondansetron (ZOFRAN ODT) 4 MG ODT tab Take 1 tablet (4 mg) by mouth every 6 hours as needed for nausea 30 tablet 1    Semaglutide-Weight Management (WEGOVY) 2.4 MG/0.75ML pen Inject 2.4 mg subcutaneously once a week. 9 mL 3    sertraline (ZOLOFT) 50 MG tablet Take 1 tablet (50 mg) by mouth daily 90 tablet 3    SUMAtriptan (IMITREX) 50 MG tablet Take 1 tablet (50 mg) by mouth at onset of headache for migraine. May repeat dose in 2 hours.  Do not exceed 200 mg in 24 hours 18 tablet 11    traZODone (DESYREL) 50 MG tablet Take 1 tablet (50 mg) by mouth at bedtime. 90 tablet 1                  Objective           Vitals:  No vitals were obtained today due to virtual visit.    Physical Exam   GENERAL: alert and no distress  EYES: Eyes grossly normal to inspection.  No discharge or erythema, or obvious scleral/conjunctival abnormalities.  RESP: No audible wheeze, cough, or visible cyanosis.    SKIN: Visible skin clear. No significant rash, abnormal pigmentation or lesions.  NEURO: Cranial nerves grossly intact.  Mentation and speech appropriate for age.  PSYCH: Appropriate affect, tone, and pace of words          Video-Visit Details    Type of service:  Video Visit   Originating Location (pt. Location): Home  Video start:  11:39am  Video end:   11:56am    Distant Location (provider location):  On-site  Platform used for Video Visit: Jessie  Signed Electronically by: Tray Monroe MD

## 2025-03-05 ENCOUNTER — TRANSFERRED RECORDS (OUTPATIENT)
Dept: HEALTH INFORMATION MANAGEMENT | Facility: CLINIC | Age: 45
End: 2025-03-05
Payer: COMMERCIAL

## 2025-03-18 ENCOUNTER — PATIENT OUTREACH (OUTPATIENT)
Dept: CARE COORDINATION | Facility: CLINIC | Age: 45
End: 2025-03-18
Payer: COMMERCIAL

## 2025-03-24 ENCOUNTER — TRANSFERRED RECORDS (OUTPATIENT)
Dept: HEALTH INFORMATION MANAGEMENT | Facility: CLINIC | Age: 45
End: 2025-03-24
Payer: COMMERCIAL

## 2025-04-01 ENCOUNTER — PATIENT OUTREACH (OUTPATIENT)
Dept: CARE COORDINATION | Facility: CLINIC | Age: 45
End: 2025-04-01
Payer: COMMERCIAL

## 2025-04-06 ENCOUNTER — MYC REFILL (OUTPATIENT)
Dept: PEDIATRICS | Facility: CLINIC | Age: 45
End: 2025-04-06
Payer: COMMERCIAL

## 2025-04-06 DIAGNOSIS — F41.1 GAD (GENERALIZED ANXIETY DISORDER): ICD-10-CM

## 2025-04-11 ENCOUNTER — TRANSFERRED RECORDS (OUTPATIENT)
Dept: HEALTH INFORMATION MANAGEMENT | Facility: CLINIC | Age: 45
End: 2025-04-11
Payer: COMMERCIAL

## 2025-05-02 ENCOUNTER — TRANSFERRED RECORDS (OUTPATIENT)
Dept: HEALTH INFORMATION MANAGEMENT | Facility: CLINIC | Age: 45
End: 2025-05-02
Payer: COMMERCIAL

## 2025-05-07 ENCOUNTER — TRANSFERRED RECORDS (OUTPATIENT)
Dept: HEALTH INFORMATION MANAGEMENT | Facility: CLINIC | Age: 45
End: 2025-05-07
Payer: COMMERCIAL

## 2025-05-20 ENCOUNTER — TRANSFERRED RECORDS (OUTPATIENT)
Dept: HEALTH INFORMATION MANAGEMENT | Facility: CLINIC | Age: 45
End: 2025-05-20
Payer: COMMERCIAL

## 2025-05-31 ENCOUNTER — HEALTH MAINTENANCE LETTER (OUTPATIENT)
Age: 45
End: 2025-05-31

## 2025-07-01 ENCOUNTER — TRANSFERRED RECORDS (OUTPATIENT)
Dept: HEALTH INFORMATION MANAGEMENT | Facility: CLINIC | Age: 45
End: 2025-07-01
Payer: COMMERCIAL

## 2025-07-23 DIAGNOSIS — F51.04 CHRONIC INSOMNIA: ICD-10-CM

## 2025-07-23 RX ORDER — TRAZODONE HYDROCHLORIDE 50 MG/1
50 TABLET ORAL AT BEDTIME
Qty: 90 TABLET | Refills: 1 | Status: SHIPPED | OUTPATIENT
Start: 2025-07-23

## 2025-07-23 NOTE — TELEPHONE ENCOUNTER
Trazodone refilled. Last visit was 11/2024. Last fill was 12/2/24. It appears 6 months worth lasted over 7.5 months.  Bennett Goltz, PA-C

## 2025-09-04 ENCOUNTER — ANCILLARY PROCEDURE (OUTPATIENT)
Dept: GENERAL RADIOLOGY | Facility: CLINIC | Age: 45
End: 2025-09-04
Payer: COMMERCIAL

## 2025-09-04 ENCOUNTER — OFFICE VISIT (OUTPATIENT)
Dept: FAMILY MEDICINE | Facility: CLINIC | Age: 45
End: 2025-09-04
Payer: COMMERCIAL

## 2025-09-04 VITALS
SYSTOLIC BLOOD PRESSURE: 104 MMHG | BODY MASS INDEX: 30.64 KG/M2 | WEIGHT: 214 LBS | HEIGHT: 70 IN | DIASTOLIC BLOOD PRESSURE: 80 MMHG | OXYGEN SATURATION: 100 % | HEART RATE: 80 BPM | TEMPERATURE: 98 F

## 2025-09-04 DIAGNOSIS — R09.89 CHEST CONGESTION: ICD-10-CM

## 2025-09-04 DIAGNOSIS — J06.9 UPPER RESPIRATORY TRACT INFECTION, UNSPECIFIED TYPE: Primary | ICD-10-CM

## 2025-09-04 DIAGNOSIS — R05.1 ACUTE COUGH: ICD-10-CM

## 2025-09-04 ASSESSMENT — PAIN SCALES - GENERAL: PAINLEVEL_OUTOF10: NO PAIN (0)
